# Patient Record
Sex: FEMALE | URBAN - NONMETROPOLITAN AREA
[De-identification: names, ages, dates, MRNs, and addresses within clinical notes are randomized per-mention and may not be internally consistent; named-entity substitution may affect disease eponyms.]

---

## 2018-03-27 ENCOUNTER — OFFICE VISIT (OUTPATIENT)
Dept: MEDICAL GROUP | Facility: PHYSICIAN GROUP | Age: 48
End: 2018-03-27
Payer: COMMERCIAL

## 2018-03-27 VITALS
SYSTOLIC BLOOD PRESSURE: 138 MMHG | DIASTOLIC BLOOD PRESSURE: 84 MMHG | TEMPERATURE: 99.2 F | WEIGHT: 213 LBS | HEIGHT: 65 IN | BODY MASS INDEX: 35.49 KG/M2 | HEART RATE: 72 BPM | RESPIRATION RATE: 16 BRPM | OXYGEN SATURATION: 98 %

## 2018-03-27 DIAGNOSIS — E66.9 OBESITY (BMI 35.0-39.9 WITHOUT COMORBIDITY): ICD-10-CM

## 2018-03-27 DIAGNOSIS — R53.83 FATIGUE, UNSPECIFIED TYPE: ICD-10-CM

## 2018-03-27 DIAGNOSIS — Z72.0 TOBACCO ABUSE: ICD-10-CM

## 2018-03-27 DIAGNOSIS — N95.1 POST MENOPAUSAL SYNDROME: ICD-10-CM

## 2018-03-27 DIAGNOSIS — Z13.6 SCREENING FOR CARDIOVASCULAR CONDITION: ICD-10-CM

## 2018-03-27 PROCEDURE — 99204 OFFICE O/P NEW MOD 45 MIN: CPT | Performed by: NURSE PRACTITIONER

## 2018-03-27 RX ORDER — VENLAFAXINE HYDROCHLORIDE 37.5 MG/1
37.5 CAPSULE, EXTENDED RELEASE ORAL DAILY
Qty: 30 CAP | Refills: 3 | Status: SHIPPED | OUTPATIENT
Start: 2018-03-27 | End: 2023-10-24

## 2018-03-27 ASSESSMENT — PATIENT HEALTH QUESTIONNAIRE - PHQ9: CLINICAL INTERPRETATION OF PHQ2 SCORE: 0

## 2018-03-27 NOTE — ASSESSMENT & PLAN NOTE
Patient reports she has gained 50 lbs in the last 2 years since moving to [place].  She reports that her eating habits have not changed.  When she has tried to diet, she cannot lose weight.  She reports her job is very physical, and does not participate in routine exercise.  She is wondering if her thyroid is not working correctly.  Her mother has hypothyroidism.  Patient has not been diagnosed with thyroid problems in the past. Patient reports fatigue, dry skin, brain fog. She denies constipation or cold intolerance. She admits to snoring and waking several times at night. We did discuss sleep study to look for sleep apnea, the patient will like to wait.  She will consider sleep study referral and medical weight management referral if lab work results are normal. Will get a TSH and free T4.

## 2018-03-27 NOTE — ASSESSMENT & PLAN NOTE
Patient reports she smokes about a pack a day for the last 40 years.  She reports she would like to quit and has tried nicotine gum.  She is planning on trying the nicotine patch next.  She is hesitant to use oral medication because she reports she had suicidal thoughts as a teenager.  She denies suicidal thoughts since then.  Also discussed 1-800-QUIT-NOW as a resource.

## 2018-03-27 NOTE — PROGRESS NOTES
CC: To establish care, unexpected weight gain, and postmenopausal syndrome    HISTORY OF THE PRESENT ILLNESS: Patient is a 47 y.o. female. This pleasant patient is here today to establish care and for postmenopausal symptoms, and unexpected weight gain.  Patient moved to [facility] about 2 years ago, and has not had a primary care provider in 4 years.  She does not take any routine medications and denies any chronic health problems.     Health Maintenance: Patient had hysterectomy 15 years ago for endometriosis.  Pap smears are not necessary.      Post menopausal syndrome  Patient reports an increase in irritability in the last couple years. She also reports constantly feeling hot. She reports she tried over-the-counter product for menopausal symptoms, but this caused severe diarrhea and she discontinued it. She does report that it helped her mood.  She is requesting hormone replacement.  She has not taken prescribed HRT in the past, though she did have total abdominal hysterectomy oopherectomy 15 years ago.  She has a history of breast cancer in her family and we discussed risks of HRT.  Non-HRT options reviewed.  Patient would like to trial low-dose venlafaxine.    Obesity (BMI 35.0-39.9 without comorbidity) (HCC)  Patient reports she has gained 50 lbs in the last 2 years since moving to [place].  She reports that her eating habits have not changed.  When she has tried to diet, she cannot lose weight.  She reports her job is very physical, and does not participate in routine exercise.  She is wondering if her thyroid is not working correctly.  Her mother has hypothyroidism.  Patient has not been diagnosed with thyroid problems in the past. Patient reports fatigue, dry skin, brain fog. She denies constipation or cold intolerance. She admits to snoring and waking several times at night. We did discuss sleep study to look for sleep apnea, the patient will like to wait.  She will consider sleep study referral and  medical weight management referral if lab work results are normal. Will get a TSH and free T4.    Tobacco abuse  Patient reports she smokes about a pack a day for the last 40 years.  She reports she would like to quit and has tried nicotine gum.  She is planning on trying the nicotine patch next.  She is hesitant to use oral medication because she reports she had suicidal thoughts as a teenager.  She denies suicidal thoughts since then.  Also discussed 1-800-QUIT-NOW as a resource.      Allergies: Codeine    Current Outpatient Prescriptions Ordered in [facility]   Medication Sig Dispense Refill   • venlafaxine XR (EFFEXOR XR) 37.5 MG CAPSULE SR 24 HR Take 1 Cap by mouth every day. 30 Cap 3     No current Epic-ordered facility-administered medications on file.        No past medical history on file.    Past Surgical History:   Procedure Laterality Date   • ACL RECONSTRUCTION Left 2007   • LAPAROSCOPY  2006    lysis of adhesions   • HYSTERECTOMY, TOTAL ABDOMINAL  2002    ovaries taken also   • TUBAL LIGATION  1997   • PRIMARY C SECTION  1987       Social History   Substance Use Topics   • Smoking status: Current Every Day Smoker     Packs/day: 1.00     Years: 40.00     Types: Cigarettes   • Smokeless tobacco: Never Used   • Alcohol use Yes      Comment: occ       Family History   Problem Relation Age of Onset   • Hypertension Mother    • Thyroid Mother    • Alcohol abuse Father    • Lung Disease Father      COPD   • Breast Cancer Sister    • No Known Problems Brother    • Diabetes Maternal Grandmother    • Diabetes Paternal Grandmother    • Breast Cancer Paternal Grandmother    • Breast Cancer Paternal Aunt        ROS:     - Constitutional: Negative for fever, chills.     - HEENT: Negative for headaches, vision changes, hearing changes, ear pain, rhinorrhea, sinus congestion, and sore throat.      - Respiratory: Negative for cough. Positive mild dyspnea with exertion.      - Cardiovascular: Negative for chest pain and  "bilateral lower extremity edema.     - Gastrointestinal: Negative for heartburn, nausea, vomiting, abdominal pain,  melena.     - Genitourinary: Negative for dysuria, polyuria.    - Musculoskeletal: Negative for myalgias.     - Skin: Negative for rash.     - Neurological: Negative for dizziness, tingling, tremors.     - Psychiatric/Behavioral: Negative for depression, suicidal/homicidal ideation.            Exam: Blood pressure 138/84, pulse 72, temperature 37.3 °C (99.2 °F), resp. rate 16, height 1.638 m (5' 4.5\"), weight 96.6 kg (213 lb), SpO2 98 %. Body mass index is 36 kg/m².    General: Alert, pleasant, obese habitus, well developed female in NAD  HEENT: Normocephalic. Eyes conjunctiva clear lids without ptosis, pupils equal and reactive to light, ears normal shape and contour, canals are clear bilaterally, tympanic membranes are pearly gray with good light reflex,  oropharynx is without erythema, edema or exudates.   Neck: Supple without bruit. Thyroid is not enlarged.  Pulmonary: Clear to ausculation.  Normal effort. No rales, ronchi, or wheezing.  Cardiovascular: Normal rate and rhythm without murmur. Carotid and radial pulses are intact and equal bilaterally.  No lower extremity edema.  Abdomen: Soft, nontender, nondistended. Normal bowel sounds. Liver and spleen are not palpable  Neurologic: Grossly nonfocal  Lymph: No cervical or supraclavicular lymph nodes are palpable  Skin: Warm and dry.  No obvious lesions.  Musculoskeletal: Normal gait.   Psych: Normal mood and affect. Alert and oriented. Judgment and insight is normal.    Please note that this dictation was created using voice recognition software. I have made every reasonable attempt to correct obvious errors, but I expect that there are errors of grammar and possibly content that I did not discover before finalizing the note.      Assessment/Plan  1. Obesity (BMI 35.0-39.9 without comorbidity)  Will notify patient of results on My Chart.  Will " consider referral to Sleep Medicine and Medical Weight Management.  - TSH; Future  - FREE THYROXINE; Future    2. Fatigue, unspecified type  Will notify patient of results.  - VITAMIN D,25 HYDROXY; Future    3. Screening for cardiovascular condition  Will notify patient of results.  - LIPID PROFILE; Future  - COMP METABOLIC PANEL; Future  - ESTIMATED GFR; Future    4. Post menopausal syndrome  We reviewed that medication takes 4 to 6 weeks to reach full effect, and that side effects of headache and nausea are temporary lasting about 2 weeks.  Patient will follow-up in clinic in 4 to 6 weeks.  - venlafaxine XR (EFFEXOR XR) 37.5 MG CAPSULE SR 24 HR; Take 1 Cap by mouth every day.  Dispense: 30 Cap; Refill: 3    5. Tobacco abuse  Patient will continue to work on smoking cessation.    Patient will return to clinic in 4 to 6 weeks for follow-up on venlafaxine for post menopausal symptoms and to review labs.

## 2018-04-09 ENCOUNTER — HOSPITAL ENCOUNTER (OUTPATIENT)
Dept: LAB | Facility: MEDICAL CENTER | Age: 48
End: 2018-04-09
Attending: NURSE PRACTITIONER
Payer: COMMERCIAL

## 2018-04-09 DIAGNOSIS — E66.9 OBESITY (BMI 35.0-39.9 WITHOUT COMORBIDITY): ICD-10-CM

## 2018-04-09 DIAGNOSIS — R53.83 FATIGUE, UNSPECIFIED TYPE: ICD-10-CM

## 2018-04-09 DIAGNOSIS — Z13.6 SCREENING FOR CARDIOVASCULAR CONDITION: ICD-10-CM

## 2018-04-09 LAB
25(OH)D3 SERPL-MCNC: 25 NG/ML (ref 30–100)
ALBUMIN SERPL BCP-MCNC: 4.2 G/DL (ref 3.2–4.9)
ALBUMIN/GLOB SERPL: 1.4 G/DL
ALP SERPL-CCNC: 29 U/L (ref 30–99)
ALT SERPL-CCNC: 18 U/L (ref 2–50)
ANION GAP SERPL CALC-SCNC: 7 MMOL/L (ref 0–11.9)
AST SERPL-CCNC: 20 U/L (ref 12–45)
BILIRUB SERPL-MCNC: 0.4 MG/DL (ref 0.1–1.5)
BUN SERPL-MCNC: 16 MG/DL (ref 8–22)
CALCIUM SERPL-MCNC: 10 MG/DL (ref 8.5–10.5)
CHLORIDE SERPL-SCNC: 105 MMOL/L (ref 96–112)
CHOLEST SERPL-MCNC: 161 MG/DL (ref 100–199)
CO2 SERPL-SCNC: 27 MMOL/L (ref 20–33)
CREAT SERPL-MCNC: 0.64 MG/DL (ref 0.5–1.4)
GLOBULIN SER CALC-MCNC: 3.1 G/DL (ref 1.9–3.5)
GLUCOSE SERPL-MCNC: 89 MG/DL (ref 65–99)
HDLC SERPL-MCNC: 39 MG/DL
LDLC SERPL CALC-MCNC: 106 MG/DL
POTASSIUM SERPL-SCNC: 4.2 MMOL/L (ref 3.6–5.5)
PROT SERPL-MCNC: 7.3 G/DL (ref 6–8.2)
SODIUM SERPL-SCNC: 139 MMOL/L (ref 135–145)
T4 FREE SERPL-MCNC: 0.73 NG/DL (ref 0.53–1.43)
TRIGL SERPL-MCNC: 78 MG/DL (ref 0–149)
TSH SERPL DL<=0.005 MIU/L-ACNC: 2.24 UIU/ML (ref 0.38–5.33)

## 2018-04-09 PROCEDURE — 84439 ASSAY OF FREE THYROXINE: CPT

## 2018-04-09 PROCEDURE — 36415 COLL VENOUS BLD VENIPUNCTURE: CPT

## 2018-04-09 PROCEDURE — 80053 COMPREHEN METABOLIC PANEL: CPT

## 2018-04-09 PROCEDURE — 84443 ASSAY THYROID STIM HORMONE: CPT

## 2018-04-09 PROCEDURE — 82306 VITAMIN D 25 HYDROXY: CPT

## 2018-04-09 PROCEDURE — 80061 LIPID PANEL: CPT

## 2023-10-24 ENCOUNTER — OFFICE VISIT (OUTPATIENT)
Dept: SLEEP MEDICINE | Facility: MEDICAL CENTER | Age: 53
End: 2023-10-24
Attending: INTERNAL MEDICINE
Payer: COMMERCIAL

## 2023-10-24 ENCOUNTER — HOSPITAL ENCOUNTER (OUTPATIENT)
Dept: RADIOLOGY | Facility: MEDICAL CENTER | Age: 53
End: 2023-10-24

## 2023-10-24 VITALS
DIASTOLIC BLOOD PRESSURE: 80 MMHG | WEIGHT: 243 LBS | BODY MASS INDEX: 40.48 KG/M2 | HEIGHT: 65 IN | SYSTOLIC BLOOD PRESSURE: 130 MMHG | OXYGEN SATURATION: 91 % | HEART RATE: 83 BPM

## 2023-10-24 DIAGNOSIS — Z87.891 FORMER SMOKER: ICD-10-CM

## 2023-10-24 DIAGNOSIS — J44.9 OBSTRUCTIVE LUNG DISEASE (GENERALIZED) (HCC): ICD-10-CM

## 2023-10-24 PROCEDURE — 3079F DIAST BP 80-89 MM HG: CPT | Performed by: INTERNAL MEDICINE

## 2023-10-24 PROCEDURE — 99212 OFFICE O/P EST SF 10 MIN: CPT | Performed by: INTERNAL MEDICINE

## 2023-10-24 PROCEDURE — 3075F SYST BP GE 130 - 139MM HG: CPT | Performed by: INTERNAL MEDICINE

## 2023-10-24 PROCEDURE — 99204 OFFICE O/P NEW MOD 45 MIN: CPT | Performed by: INTERNAL MEDICINE

## 2023-10-24 RX ORDER — LOSARTAN POTASSIUM 25 MG/1
TABLET ORAL
COMMUNITY
Start: 2023-10-03 | End: 2023-11-27

## 2023-10-24 RX ORDER — LEVOTHYROXINE SODIUM 0.03 MG/1
25 TABLET ORAL
COMMUNITY
Start: 2023-08-11

## 2023-10-24 ASSESSMENT — ENCOUNTER SYMPTOMS
SPEECH CHANGE: 0
STRIDOR: 0
DOUBLE VISION: 0
FALLS: 0
FEVER: 0
DIAPHORESIS: 0
FOCAL WEAKNESS: 0
EYE REDNESS: 0
CLAUDICATION: 0
SHORTNESS OF BREATH: 0
WEAKNESS: 0
HEARTBURN: 0
EYE DISCHARGE: 0
HEADACHES: 0
NAUSEA: 0
HEMOPTYSIS: 0
DIARRHEA: 0
EYE PAIN: 0
ABDOMINAL PAIN: 0
CHILLS: 0
ORTHOPNEA: 0
SORE THROAT: 0
COUGH: 0
SPUTUM PRODUCTION: 0
TREMORS: 0
DIZZINESS: 0
WEIGHT LOSS: 0
PALPITATIONS: 0
PND: 0
NECK PAIN: 0
WHEEZING: 0
BACK PAIN: 0
VOMITING: 0
PHOTOPHOBIA: 0
CONSTIPATION: 0
BLURRED VISION: 0
MYALGIAS: 0
DEPRESSION: 0
SINUS PAIN: 0

## 2023-10-24 ASSESSMENT — PATIENT HEALTH QUESTIONNAIRE - PHQ9: CLINICAL INTERPRETATION OF PHQ2 SCORE: 0

## 2023-10-24 NOTE — PROGRESS NOTES
Chief Complaint   Patient presents with    New Patient     REF BY DR. BAY FOR CHRONIC COUGH, ASTHMA    Results     CXR 9/11/23       HPI: This patient is a 53 y.o. female presenting for evaluation of chronic cough, presumed asthma.  The patient's past medical history significant for hypertension and hypothyroid.  She is a former tobacco smoker with 45-pack-year history and quit in May 2023.  She has 4 dogs in the house but denies any allergy symptoms to them.  No mold damage in the home.  No wood-burning fires.  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  She believes she developed COVID in January 2020 and was quite ill at that time but then suffered a more mild case in March 2022 after which she seemed to have recurrent episodes of bronchitis 1 of which required admission to the hospital at [facility] in May.  Eosinophils were high normal at that time.  She has been treated on multiple occasions over the past year with bronchodilator therapy, oral and inhaled corticosteroids.  Her most recent ER visit was in September at which point she was given steroids in addition to another inhaler.  She was also experiencing bilateral lower extremity swelling with recurrent steroid burst and on amlodipine.  She had been started on Lasix and potassium for this.  After her most recent ER visit she stopped all medication other than losartan and Synthroid including bronchodilators and inhaled corticosteroid therapies.  She did complete steroid burst and feels her breathing is markedly improved.  She denies shortness of breath on a daily basis outside of her level of fitness.  She cannot identify any particular triggers to her recurrent symptoms and denies gastroesophageal reflux or postnasal drip.  She did not tolerate powdered forms of inhalants with Advair discus or Trelegy and does not desire another inhaler today.  Chest x-ray from 9/11/2023 was clear.  She is  91% at rest on room air today.  No past medical history on file.    Social History     Socioeconomic History    Marital status: Unknown     Spouse name: Not on file    Number of children: Not on file    Years of education: Not on file    Highest education level: Not on file   Occupational History    Not on file   Tobacco Use    Smoking status: Every Day     Current packs/day: 1.00     Average packs/day: 1 pack/day for 40.0 years (40.0 ttl pk-yrs)     Types: Cigarettes    Smokeless tobacco: Never   Substance and Sexual Activity    Alcohol use: Yes     Comment: occ    Drug use: No    Sexual activity: Yes     Partners: Male     Comment:    Other Topics Concern    Not on file   Social History Narrative    Not on file     Social Determinants of Health     Financial Resource Strain: Not on file   Food Insecurity: Not on file   Transportation Needs: Not on file   Physical Activity: Not on file   Stress: Not on file   Social Connections: Not on file   Intimate Partner Violence: Not on file   Housing Stability: Not on file       Family History   Problem Relation Age of Onset    Hypertension Mother     Thyroid Mother     Alcohol abuse Father     Lung Disease Father         COPD    Breast Cancer Sister     No Known Problems Brother     Diabetes Maternal Grandmother     Diabetes Paternal Grandmother     Breast Cancer Paternal Grandmother     Breast Cancer Paternal Aunt        Current Outpatient Medications on File Prior to Visit   Medication Sig Dispense Refill    levothyroxine (SYNTHROID) 25 MCG Tab       losartan (COZAAR) 25 MG Tab       venlafaxine XR (EFFEXOR XR) 37.5 MG CAPSULE SR 24 HR Take 1 Cap by mouth every day. (Patient not taking: Reported on 10/24/2023) 30 Cap 3     No current facility-administered medications on file prior to visit.       Allergies: Codeine    ROS:   Review of Systems   Constitutional:  Negative for chills, diaphoresis, fever, malaise/fatigue and weight loss.   HENT:  Negative for  "congestion, ear discharge, ear pain, hearing loss, nosebleeds, sinus pain, sore throat and tinnitus.    Eyes:  Negative for blurred vision, double vision, photophobia, pain, discharge and redness.   Respiratory:  Negative for cough, hemoptysis, sputum production, shortness of breath, wheezing and stridor.    Cardiovascular:  Negative for chest pain, palpitations, orthopnea, claudication, leg swelling and PND.   Gastrointestinal:  Negative for abdominal pain, constipation, diarrhea, heartburn, nausea and vomiting.   Genitourinary:  Negative for dysuria and urgency.   Musculoskeletal:  Negative for back pain, falls, joint pain, myalgias and neck pain.   Skin:  Negative for itching and rash.   Neurological:  Negative for dizziness, tremors, speech change, focal weakness, weakness and headaches.   Endo/Heme/Allergies:  Negative for environmental allergies.   Psychiatric/Behavioral:  Negative for depression.        /80 (BP Location: Right arm, Patient Position: Sitting, BP Cuff Size: Adult)   Pulse 83   Ht 1.651 m (5' 5\")   Wt 110 kg (243 lb)   SpO2 91%     Physical Exam:  Physical Exam  Constitutional:       General: She is not in acute distress.     Appearance: Normal appearance. She is well-developed. She is obese.   HENT:      Head: Normocephalic and atraumatic.      Right Ear: External ear normal.      Left Ear: External ear normal.      Nose: Nose normal. No congestion.      Mouth/Throat:      Mouth: Mucous membranes are moist.      Pharynx: Oropharynx is clear. No oropharyngeal exudate.   Eyes:      General: No scleral icterus.     Extraocular Movements: Extraocular movements intact.      Conjunctiva/sclera: Conjunctivae normal.      Pupils: Pupils are equal, round, and reactive to light.   Neck:      Vascular: No JVD.      Trachea: No tracheal deviation.   Cardiovascular:      Rate and Rhythm: Normal rate and regular rhythm.      Heart sounds: Normal heart sounds. No murmur heard.     No friction rub. " No gallop.   Pulmonary:      Effort: Pulmonary effort is normal. No accessory muscle usage or respiratory distress.      Breath sounds: Normal breath sounds. No wheezing or rales.   Abdominal:      General: There is no distension.      Palpations: Abdomen is soft.      Tenderness: There is no abdominal tenderness.   Musculoskeletal:         General: No tenderness or deformity. Normal range of motion.      Cervical back: Normal range of motion and neck supple.      Right lower leg: No edema.      Left lower leg: No edema.   Lymphadenopathy:      Cervical: No cervical adenopathy.   Skin:     General: Skin is warm and dry.      Findings: No rash.      Nails: There is no clubbing.   Neurological:      Mental Status: She is alert and oriented to person, place, and time.      Cranial Nerves: No cranial nerve deficit.      Gait: Gait normal.   Psychiatric:         Behavior: Behavior normal.       PFTs as reviewed by me personally: None    Imaging as reviewed by me personally: As per HPI    Assessment:  1. Obstructive lung disease (generalized) ([facility])  PULMONARY FUNCTION TESTS -Test requested: Complete Pulmonary Function Test    Albuterol-Budesonide 90-80 MCG/ACT Aerosol      2. Former smoker            Plan:  This patient is presenting with signs and symptoms of what seems more consistent with asthma-COPD overlap syndrome.  I do think she would benefit from inhaled corticosteroids given recurrent ER visits over the past year but she declined any maintenance inhaler.  She did agree to full pulmonary function testing and a sample of air supra which is inhaled corticosteroid with albuterol, and new formulation that can be used as mainly rescue therapy and is now on powdered inhalants.  Patient is tobacco free.  Tobacco free.  We did not discuss lung cancer screening today but can discuss at follow-up.  Full PFTs ordered.  Encouraged ongoing abstinence.  Return in about 3 months (around 1/24/2024) for PFT any time.

## 2023-11-15 PROBLEM — M23.321 MENISCUS, MEDIAL, POSTERIOR HORN DERANGEMENT, RIGHT: Status: ACTIVE | Noted: 2023-11-15

## 2024-10-10 ENCOUNTER — PATIENT MESSAGE (OUTPATIENT)
Dept: HEALTH INFORMATION MANAGEMENT | Facility: OTHER | Age: 54
End: 2024-10-10

## 2024-11-21 ENCOUNTER — HOSPITAL ENCOUNTER (OUTPATIENT)
Dept: RADIOLOGY | Facility: MEDICAL CENTER | Age: 54
End: 2024-11-21
Attending: STUDENT IN AN ORGANIZED HEALTH CARE EDUCATION/TRAINING PROGRAM
Payer: COMMERCIAL

## 2024-11-21 DIAGNOSIS — R60.0 BILATERAL EDEMA OF LOWER EXTREMITY: ICD-10-CM

## 2024-11-21 DIAGNOSIS — R06.02 SHORTNESS OF BREATH: ICD-10-CM

## 2024-11-21 PROCEDURE — 700117 HCHG RX CONTRAST REV CODE 255

## 2024-11-21 PROCEDURE — 71260 CT THORAX DX C+: CPT

## 2024-11-21 RX ADMIN — IOHEXOL 75 ML: 350 INJECTION, SOLUTION INTRAVENOUS at 08:14

## 2025-02-12 ENCOUNTER — HOSPITAL ENCOUNTER (OUTPATIENT)
Dept: RADIOLOGY | Facility: MEDICAL CENTER | Age: 55
End: 2025-02-12
Attending: STUDENT IN AN ORGANIZED HEALTH CARE EDUCATION/TRAINING PROGRAM
Payer: COMMERCIAL

## 2025-02-12 DIAGNOSIS — R16.0 LIVER MASS: ICD-10-CM

## 2025-02-12 PROCEDURE — 700117 HCHG RX CONTRAST REV CODE 255: Mod: JZ

## 2025-02-12 PROCEDURE — 74183 MRI ABD W/O CNTR FLWD CNTR: CPT

## 2025-02-12 PROCEDURE — A9579 GAD-BASE MR CONTRAST NOS,1ML: HCPCS | Mod: JZ

## 2025-02-12 RX ADMIN — GADOTERIDOL 20 ML: 279.3 INJECTION, SOLUTION INTRAVENOUS at 08:49

## 2025-02-13 ENCOUNTER — OFFICE VISIT (OUTPATIENT)
Dept: SLEEP MEDICINE | Facility: MEDICAL CENTER | Age: 55
End: 2025-02-13
Payer: COMMERCIAL

## 2025-02-13 VITALS
WEIGHT: 237 LBS | BODY MASS INDEX: 39.49 KG/M2 | SYSTOLIC BLOOD PRESSURE: 146 MMHG | OXYGEN SATURATION: 95 % | HEART RATE: 86 BPM | HEIGHT: 65 IN | DIASTOLIC BLOOD PRESSURE: 80 MMHG

## 2025-02-13 DIAGNOSIS — R06.09 DYSPNEA ON EXERTION: ICD-10-CM

## 2025-02-13 DIAGNOSIS — F15.11 METHAMPHETAMINE ABUSE IN REMISSION (HCC): ICD-10-CM

## 2025-02-13 DIAGNOSIS — G47.33 OBSTRUCTIVE SLEEP APNEA: ICD-10-CM

## 2025-02-13 DIAGNOSIS — R91.8 LUNG NODULES: ICD-10-CM

## 2025-02-13 DIAGNOSIS — J44.89 EMPHYSEMA WITH CHRONIC BRONCHITIS (HCC): ICD-10-CM

## 2025-02-13 DIAGNOSIS — F17.211 NICOTINE DEPENDENCE, CIGARETTES, IN REMISSION: ICD-10-CM

## 2025-02-13 PROCEDURE — 94761 N-INVAS EAR/PLS OXIMETRY MLT: CPT

## 2025-02-13 PROCEDURE — 99211 OFF/OP EST MAY X REQ PHY/QHP: CPT

## 2025-02-13 PROCEDURE — 99215 OFFICE O/P EST HI 40 MIN: CPT

## 2025-02-13 RX ORDER — GABAPENTIN 100 MG/1
CAPSULE ORAL
COMMUNITY
Start: 2025-01-29

## 2025-02-13 RX ORDER — GUAIFENESIN 1200 MG/1
1 TABLET, EXTENDED RELEASE ORAL 2 TIMES DAILY PRN
Qty: 28 TABLET | Refills: 6 | Status: SHIPPED | OUTPATIENT
Start: 2025-02-13

## 2025-02-13 RX ORDER — ATORVASTATIN CALCIUM 10 MG/1
TABLET, FILM COATED ORAL
COMMUNITY
Start: 2025-01-31

## 2025-02-13 RX ORDER — TIOTROPIUM BROMIDE AND OLODATEROL 3.124; 2.736 UG/1; UG/1
2 SPRAY, METERED RESPIRATORY (INHALATION) DAILY
Qty: 4 G | Refills: 5 | Status: SHIPPED | OUTPATIENT
Start: 2025-02-13

## 2025-02-13 RX ORDER — LEVALBUTEROL TARTRATE 45 UG/1
2 AEROSOL, METERED ORAL EVERY 4 HOURS PRN
Qty: 15 G | Refills: 11 | Status: SHIPPED | OUTPATIENT
Start: 2025-02-13

## 2025-02-13 RX ORDER — HYDROCHLOROTHIAZIDE 25 MG/1
TABLET ORAL
COMMUNITY
Start: 2025-01-06

## 2025-02-13 ASSESSMENT — ENCOUNTER SYMPTOMS
HEMOPTYSIS: 0
HEARTBURN: 0
STRIDOR: 0
CHILLS: 0
SPUTUM PRODUCTION: 0
DIZZINESS: 0
DEPRESSION: 0
WEIGHT LOSS: 0
WHEEZING: 1
FEVER: 0
PALPITATIONS: 0
SHORTNESS OF BREATH: 1
COUGH: 1

## 2025-02-13 NOTE — ASSESSMENT & PLAN NOTE
Order DANIELLA workup d/t cystic appearance on CT  Evaluate echocardiogram due to meth use history   No oxygen desaturation noted on 6 MWT    Orders:    DANIELLA IGG JENIFER W/RFLX TO DANIELLA IGG IFA; Future    ANGIOTENSIN I CONVERTING ENZYME; Future    ANTI-JO01 ABS    ANTI SCL-70 ABS; Future    ANTI-LA (SS-B) AB (RDL)    ANTI-RO (SS-A) AB (RDL)    CCP    MPO AND PR3 WITH REFLEX TO ANCA; Future    RHEUMATOID ARTHRITIS FACTOR; Future    EC-ECHOCARDIOGRAM COMPLETE W/O CONT; Future

## 2025-02-13 NOTE — ASSESSMENT & PLAN NOTE
35 pack year smoking history, quit in 2024  Uses nicotine lozenges that she feels greatly help her cravings

## 2025-02-13 NOTE — ASSESSMENT & PLAN NOTE
Small benign-appearing right lung nodules measuring 3 mm and 5 mm on 11/21/2024    Repeat CT due in November 2025

## 2025-02-13 NOTE — ASSESSMENT & PLAN NOTE
Order echocardiogram to evaluate for PH and RV function  Used for 13 years, quit in 2005    Orders:    EC-ECHOCARDIOGRAM COMPLETE W/O CONT; Future

## 2025-02-13 NOTE — PROGRESS NOTES
Pulmonary Clinic follow up    Date of Service: 2/13/2025    Reason for follow up:  Follow-Up (Last seen 10/24/23 w/ Dr. Pennington for Obstructive lung disease)    History of Present Illness:     Hanna Melo is a 54 y.o. female being evaluated in clinic today for emphysema and progressive dyspnea. PMH includes tobacco use, obesity, PMS, hypothyroid. Patient reports a mostly dry cough, but has been diagnosed with bronchitis 8 x over the last 2 years.     May of 2023, patient was admitted to the hospital for hypoxia of 85%. Patient was admitted for 4 days. Patient had just gotten back from [place], and she believes the humidity was the trigger at that time. Patient's symptoms worsen when going to areas of higher humidity. Patient has tried Airsupra, Pulmicort, and Albuterol but had reactions to all inhalers and felt that they worsened her breathing.     Pulmonary Hx:   - smoking history of 35 pack years, quit in 2024  - worked with microspheres at [employer], reports she used an N95 but feels she needed more protection. Patient also reports she came home with the product in her ears and all over here clothes and had exposure once the mask was removed.    - patient also used meth for 13 years, she quit in 2005    MMRC Grade:   0- Breathless only during strenuous exercise  1- Short of breath when hurrying or going up a small hill  2- Walks slower than friends due to breathlessness, has to stop at own pace  3- Stops to catch breath on level ground after 100m  4- Breathless with ambulating around house or ADLs     Review of Systems   Constitutional:  Negative for chills, fever and weight loss.   Respiratory:  Positive for cough, shortness of breath and wheezing. Negative for hemoptysis, sputum production and stridor.    Cardiovascular:  Negative for chest pain, palpitations and leg swelling.   Gastrointestinal:  Negative for heartburn.   Neurological:  Negative for dizziness.   Psychiatric/Behavioral:  Negative for  "depression.        Current Outpatient Medications on File Prior to Visit   Medication Sig Dispense Refill    gabapentin (NEURONTIN) 100 MG Cap       hydroCHLOROthiazide 25 MG Tab       atorvastatin (LIPITOR) 10 MG Tab       levothyroxine (SYNTHROID) 25 MCG Tab Take 25 mcg by mouth every morning on an empty stomach.       No current facility-administered medications on file prior to visit.       Social History     Tobacco Use    Smoking status: Former     Current packs/day: 1.00     Average packs/day: 1 pack/day for 35.6 years (35.6 ttl pk-yrs)     Types: Cigarettes     Start date: 7/26/1989    Smokeless tobacco: Never   Substance Use Topics    Alcohol use: Yes     Comment: occ    Drug use: No      Past Medical History:   Diagnosis Date    Shortness of breath     Wheezing        Past Surgical History:   Procedure Laterality Date    RECONSTRUCTION, KNEE, ACL Left 2007    LAPAROSCOPY  2006    lysis of adhesions    HYSTERECTOMY, TOTAL ABDOMINAL  2002    ovaries taken also    TUBAL LIGATION  1997    PRIMARY C SECTION  1987     Codeine    Family History   Problem Relation Age of Onset    Hypertension Mother     Thyroid Mother     Alcohol abuse Father     Lung Disease Father         COPD    Breast Cancer Sister     No Known Problems Brother     Diabetes Maternal Grandmother     Diabetes Paternal Grandmother     Breast Cancer Paternal Grandmother     Breast Cancer Paternal Aunt        Vitals:    02/13/25 1000   Height: 1.651 m (5' 5\")   Weight: 108 kg (237 lb)   Weight % change since last entry.: 0 %   BP: (!) 146/80   Pulse: 86   BMI (Calculated): 39.44       Physical Exam  Constitutional:       General: She is not in acute distress.  HENT:      Mouth/Throat:      Mouth: Mucous membranes are moist.   Eyes:      Conjunctiva/sclera: Conjunctivae normal.   Cardiovascular:      Rate and Rhythm: Normal rate and regular rhythm.   Pulmonary:      Effort: Prolonged expiration present.      Breath sounds: Wheezing present. "   Musculoskeletal:      Right lower leg: No edema.      Left lower leg: No edema.   Skin:     General: Skin is warm and dry.      Capillary Refill: Capillary refill takes less than 2 seconds.   Neurological:      General: No focal deficit present.      Mental Status: She is alert and oriented to person, place, and time.   Psychiatric:         Mood and Affect: Mood normal.       Results:    STOPBANG - Sleep Apnea Screening      Flowsheet Row Most Recent Value   S - Have you been told that you SNORE? Yes   T - Are you often TIRED during the day? Yes   O - Do you know if you stop breathing or has anyone witnessed you stop breathing while you were asleep? (OBSTRUCTION) Yes   P - Do you have high blood PRESSURE or on medication to control high blood pressure? Yes   B - Is your Body Mass Index greater than 35? (BMI) Yes   A - Are you 50 years old or older? (AGE) Yes   N - Are you a male with a NECK circumference greater than 17 inches, or a female with a neck circumference greater than 16 inches? Yes   G - Are you male? (GENDER) No   STOPBANG Total Score 7   JERILYN Risk High Risk           7 (2/13/2025 10:37 AM)     CT chest 11/21/2024:        IMPRESSION:     1. Small benign-appearing right lung nodules measuring 3 mm and 5 mm.  2. Emphysema.  3. Few areas of concern for more cystic vs emphysema changes    Vaccinations:    Covid: refuses  Flu: refuses  Pneumonia: refuses  RSV: refuses  Assessment & Plan  Emphysema with chronic bronchitis (HCC)  Chronic 2/2 smoking history of 35 pack years, quit in 2024  Worsening over the last 2 years. Patient has never had a PFT, will order.     GOLD Stage: will calculate at f/u  MMRC Group: 3  Exacerbations in the last year: 4  Change in dyspnea, sputum, or cough: Denies, unable to clear mucus     Routine Tx: Stiolto  PRN Tx: Xopenex  Encouraged regular exercise as tolerated  Annual covid and influenza vaccinations encouraged  Monitor for hypoxemia of oxygen <88% at home and schedule visit  with any changes    Notify clinic with any early signs of exacerbation including change in sputum production or color and increased dyspnea.     Orders:    CBC WITH DIFFERENTIAL; Future    IMMUNOGLOBULINS A/E/G/M, SERUM    PULMONARY FUNCTION TESTS -Test requested: Complete Pulmonary Function Test; Future    Multiple Oximetry; Future    Tiotropium Bromide-Olodaterol (STIOLTO RESPIMAT) 2.5-2.5 MCG/ACT Aero Soln; Take 2 Puffs by mouth every day.    levalbuterol (XOPENEX HFA) 45 MCG/ACT inhaler; Inhale 2 Puffs every four hours as needed for Shortness of Breath.    Guaifenesin 1200 MG TABLET SR 12 HR; Take 1 Tablet by mouth 2 times a day as needed (cough and congestion).    Obstructive sleep apnea  STOPBANG score of 7  Order split study in lab  Refer to sleep medicine    Orders:    Polysomnography, 4 or More    Referral to Pulmonary and Sleep Medicine    Dyspnea on exertion  Order DANIELLA workup d/t cystic appearance on CT  Evaluate echocardiogram due to meth use history   No oxygen desaturation noted on 6 MWT    Orders:    DANIELLA IGG JENIFER W/RFLX TO DANIELLA IGG IFA; Future    ANGIOTENSIN I CONVERTING ENZYME; Future    ANTI-JO01 ABS    ANTI SCL-70 ABS; Future    ANTI-LA (SS-B) AB (RDL)    ANTI-RO (SS-A) AB (RDL)    CCP    MPO AND PR3 WITH REFLEX TO ANCA; Future    RHEUMATOID ARTHRITIS FACTOR; Future    EC-ECHOCARDIOGRAM COMPLETE W/O CONT; Future    Methamphetamine abuse in remission (HCC)  Order echocardiogram to evaluate for PH and RV function  Used for 13 years, quit in 2005    Orders:    EC-ECHOCARDIOGRAM COMPLETE W/O CONT; Future    Nicotine dependence, cigarettes, in remission  35 pack year smoking history, quit in 2024  Uses nicotine lozenges that she feels greatly help her cravings          Lung nodules  Small benign-appearing right lung nodules measuring 3 mm and 5 mm on 11/21/2024    Repeat CT due in November 2025           Return in about 6 weeks (around 3/27/2025), or if symptoms worsen or fail to improve, for f/u on  labs, PFT, and sleep study .     This note was generated using voice recognition software which has a chance of producing errors of grammar and possibly content.  I have made every reasonable attempt to find and correct any obvious errors, but it should be expected that some may not be found prior to finalization of this note.    Time spent in record review prior to patient arrival, reviewing results, and in face-to-face encounter totaled 45 min, excluding any procedures if performed.    Odin GLEASON  [facility]

## 2025-02-13 NOTE — PROCEDURES
Multi-Ox Readings  Multi Ox #1 Room air   O2 sat % at rest 95   O2 sat % on exertion 92   O2 sat average on exertion     Multi Ox #2     O2 sat % at rest     O2 sat % on exertion     O2 sat average on exertion       Oxygen Use     Oxygen Frequency     Duration of need     Is the patient mobile within the home?     CPAP Use?     BIPAP Use?     Servo Titration

## 2025-02-13 NOTE — ASSESSMENT & PLAN NOTE
Chronic 2/2 smoking history of 35 pack years, quit in 2024  Worsening over the last 2 years. Patient has never had a PFT, will order.     GOLD Stage: will calculate at f/u  MMRC Group: 3  Exacerbations in the last year: 4  Change in dyspnea, sputum, or cough: Denies, unable to clear mucus     Routine Tx: Stiolto  PRN Tx: Xopenex  Encouraged regular exercise as tolerated  Annual covid and influenza vaccinations encouraged  Monitor for hypoxemia of oxygen <88% at home and schedule visit with any changes    Notify clinic with any early signs of exacerbation including change in sputum production or color and increased dyspnea.     Orders:    CBC WITH DIFFERENTIAL; Future    IMMUNOGLOBULINS A/E/G/M, SERUM    PULMONARY FUNCTION TESTS -Test requested: Complete Pulmonary Function Test; Future    Multiple Oximetry; Future    Tiotropium Bromide-Olodaterol (STIOLTO RESPIMAT) 2.5-2.5 MCG/ACT Aero Soln; Take 2 Puffs by mouth every day.    levalbuterol (XOPENEX HFA) 45 MCG/ACT inhaler; Inhale 2 Puffs every four hours as needed for Shortness of Breath.    Guaifenesin 1200 MG TABLET SR 12 HR; Take 1 Tablet by mouth 2 times a day as needed (cough and congestion).

## 2025-02-13 NOTE — ASSESSMENT & PLAN NOTE
STOPBANG score of 7  Order split study in lab  Refer to sleep medicine    Orders:    Polysomnography, 4 or More    Referral to Pulmonary and Sleep Medicine

## 2025-02-14 ENCOUNTER — HOSPITAL ENCOUNTER (OUTPATIENT)
Dept: LAB | Facility: MEDICAL CENTER | Age: 55
End: 2025-02-14
Payer: COMMERCIAL

## 2025-02-14 DIAGNOSIS — J44.89 EMPHYSEMA WITH CHRONIC BRONCHITIS (HCC): ICD-10-CM

## 2025-02-14 DIAGNOSIS — R06.09 DYSPNEA ON EXERTION: ICD-10-CM

## 2025-02-14 LAB
BASOPHILS # BLD AUTO: 1.3 % (ref 0–1.8)
BASOPHILS # BLD: 0.08 K/UL (ref 0–0.12)
EOSINOPHIL # BLD AUTO: 0.31 K/UL (ref 0–0.51)
EOSINOPHIL NFR BLD: 4.9 % (ref 0–6.9)
ERYTHROCYTE [DISTWIDTH] IN BLOOD BY AUTOMATED COUNT: 43 FL (ref 35.9–50)
HCT VFR BLD AUTO: 48.1 % (ref 37–47)
HGB BLD-MCNC: 15.6 G/DL (ref 12–16)
IMM GRANULOCYTES # BLD AUTO: 0.01 K/UL (ref 0–0.11)
IMM GRANULOCYTES NFR BLD AUTO: 0.2 % (ref 0–0.9)
LYMPHOCYTES # BLD AUTO: 2.4 K/UL (ref 1–4.8)
LYMPHOCYTES NFR BLD: 38 % (ref 22–41)
MCH RBC QN AUTO: 29.1 PG (ref 27–33)
MCHC RBC AUTO-ENTMCNC: 32.4 G/DL (ref 32.2–35.5)
MCV RBC AUTO: 89.6 FL (ref 81.4–97.8)
MONOCYTES # BLD AUTO: 0.51 K/UL (ref 0–0.85)
MONOCYTES NFR BLD AUTO: 8.1 % (ref 0–13.4)
NEUTROPHILS # BLD AUTO: 3.01 K/UL (ref 1.82–7.42)
NEUTROPHILS NFR BLD: 47.5 % (ref 44–72)
NRBC # BLD AUTO: 0 K/UL
NRBC BLD-RTO: 0 /100 WBC (ref 0–0.2)
PLATELET # BLD AUTO: 357 K/UL (ref 164–446)
PMV BLD AUTO: 11 FL (ref 9–12.9)
RBC # BLD AUTO: 5.37 M/UL (ref 4.2–5.4)
WBC # BLD AUTO: 6.3 K/UL (ref 4.8–10.8)

## 2025-02-14 PROCEDURE — 86431 RHEUMATOID FACTOR QUANT: CPT

## 2025-02-14 PROCEDURE — 86200 CCP ANTIBODY: CPT

## 2025-02-14 PROCEDURE — 82164 ANGIOTENSIN I ENZYME TEST: CPT

## 2025-02-14 PROCEDURE — 86038 ANTINUCLEAR ANTIBODIES: CPT

## 2025-02-14 PROCEDURE — 36415 COLL VENOUS BLD VENIPUNCTURE: CPT

## 2025-02-14 PROCEDURE — 82785 ASSAY OF IGE: CPT

## 2025-02-14 PROCEDURE — 82784 ASSAY IGA/IGD/IGG/IGM EACH: CPT

## 2025-02-14 PROCEDURE — 85025 COMPLETE CBC W/AUTO DIFF WBC: CPT

## 2025-02-14 PROCEDURE — 86235 NUCLEAR ANTIGEN ANTIBODY: CPT

## 2025-02-14 PROCEDURE — 83516 IMMUNOASSAY NONANTIBODY: CPT

## 2025-02-15 LAB — RHEUMATOID FACT SER IA-ACNC: <10 IU/ML (ref 0–14)

## 2025-02-16 LAB
ACE SERPL-CCNC: 32 U/L (ref 16–85)
ENA JO1 AB TITR SER: 2 AU/ML (ref 0–40)
ENA SCL70 IGG SER QL: 2 AU/ML (ref 0–40)
ENA SS-A 60KD AB SER-ACNC: 1 AU/ML (ref 0–40)
ENA SS-A IGG SER QL: 2 AU/ML (ref 0–40)
ENA SS-B IGG SER IA-ACNC: 1 AU/ML (ref 0–40)
IGA SERPL-MCNC: 285 MG/DL (ref 68–408)
IGG SERPL-MCNC: 981 MG/DL (ref 768–1632)
IGM SERPL-MCNC: 58 MG/DL (ref 35–263)

## 2025-02-17 LAB
CCP IGA+IGG SERPL IA-ACNC: 3 UNITS (ref 0–19)
IGE SERPL-ACNC: 139 KU/L
MYELOPEROXIDASE AB SER-ACNC: 0 AU/ML (ref 0–19)
NUCLEAR IGG SER QL IA: NORMAL
PROTEINASE3 AB SER-ACNC: 0 AU/ML (ref 0–19)

## 2025-02-20 ENCOUNTER — PATIENT MESSAGE (OUTPATIENT)
Dept: SLEEP MEDICINE | Facility: MEDICAL CENTER | Age: 55
End: 2025-02-20
Payer: COMMERCIAL

## 2025-02-20 NOTE — PATIENT COMMUNICATION
Called and spoke w pt, per pt went to go  both prescriptions ordered by Odin. K, pt states (STIOLTO RESPIMAT) 2.5-2.5 MCG/ACT Aero Soln was covered by her insurance, but the levalbuterol 45 MCG/ACT inhaler was not, ( there is a previous encounter regarding this matter 02/14/25 ) pt states she just ended up paying out of pocket for it since she has being feeling like she can't breath. Explained to pt we were unable to start a PA for it, and that we were expecting a form, from her insurance, pt states that her insurance said WE have all the information and necessary forms to complete the request, per pt she does not want to call her insurance to figure it out, she wants US to figure it out for her.

## 2025-02-21 ENCOUNTER — TELEPHONE (OUTPATIENT)
Dept: SLEEP MEDICINE | Facility: MEDICAL CENTER | Age: 55
End: 2025-02-21

## 2025-02-21 NOTE — TELEPHONE ENCOUNTER
DOCUMENTATION OF PAR STATUS:    1. Name of Medication & Dose: Levalbuterol Tartrate 45 mcg/act     2. Name of Prescription Coverage Company & phone #: [facility] [employer]    3. Date Prior Auth Submitted: 02/21/25.    4. What information was given to obtain insurance decision? Per Clifford, there is an existing case within the [facility] that has the same patient, prescriber, and drug. This case must be finalized before proceeding with similar requests.  Called and spoke with pt's Insurance plan. They informed me they do have a case open but unable to open it because it is in the pharmacies que but the representative told me she will message the pharmacist to see if they are able to move the rx up.     5. Prior Auth Status? Pending    6. Patient Notified: yes

## 2025-03-18 ENCOUNTER — PATIENT MESSAGE (OUTPATIENT)
Dept: SLEEP MEDICINE | Facility: MEDICAL CENTER | Age: 55
End: 2025-03-18
Payer: COMMERCIAL

## 2025-03-20 ENCOUNTER — APPOINTMENT (OUTPATIENT)
Dept: SLEEP MEDICINE | Facility: MEDICAL CENTER | Age: 55
End: 2025-03-20
Payer: COMMERCIAL

## 2025-03-20 DIAGNOSIS — G47.33 OBSTRUCTIVE SLEEP APNEA: ICD-10-CM

## 2025-03-20 PROCEDURE — 95811 POLYSOM 6/>YRS CPAP 4/> PARM: CPT | Performed by: STUDENT IN AN ORGANIZED HEALTH CARE EDUCATION/TRAINING PROGRAM

## 2025-03-21 ENCOUNTER — RESULTS FOLLOW-UP (OUTPATIENT)
Dept: SLEEP MEDICINE | Facility: MEDICAL CENTER | Age: 55
End: 2025-03-21

## 2025-03-21 DIAGNOSIS — G47.39 COMPLEX SLEEP APNEA SYNDROME: ICD-10-CM

## 2025-03-21 NOTE — PROCEDURES
Patient: SIMEON ZAVALA  ID: [id number] Date: 3/20/2025  MONTAGE: Standard  STUDY TYPE: Split Night  RECORDING TECHNIQUE:   After the scalp was prepared, gold plated electrodes were applied to the scalp according to the International 10-20 System. EEG (electroencephalogram) was continuously monitored from the O1-M2, O2-M1, C3-M2, C4-M1, F3-M2, and F4-M1. EOGs (electrooculograms) were monitored by electrodes placed at the left and right outer canthi. Chin EMG (electromyogram) was monitored by electrodes placed on the mentalis and sub-mentalis muscles. Nasal and oral airflow were monitored using a triple port thermocouple as well as oronasal pressure transducer. Respiratory effort was measured by inductive plethysmography technology employing abdominal and thoracic belts. Blood oxygen saturation and pulse were monitored by pulse oximetry. Heart rhythm was monitored by surface electrocardiogram. Leg EMG was studied using surface electrodes placed on left and right anterior tibialis. A microphone was used to monitor tracheal sounds and snoring. Body position was monitored and documented by technician observation.   SCORING CRITERIA:   A modification of the AASM manual for scoring of sleep and associated events was used. Obstructive apneas were scored by cessation of airflow for at least 10 seconds with continuing respiratory effort. Central apneas were scored by cessation of airflow for at least 10 seconds with no respiratory effort. Hypopneas were scored by a 30% or more reduction in airflow for at least 10 seconds accompanied by arterial oxygen desaturation of 3% or an arousal. For CMS (Medicare) patients, per AASM rule 1B, hypopneas are scored by 30% with mild reduction in airflow for at least 10 seconds accompanied by arterial saturation decreased at 4%.    DIAGNOSTIC  Study start time was 10:12:20 PM. Diagnostic recording time was 238 minutes with a total sleep time of 132 minutes resulting in a sleep efficiency of  55.56%%. Sleep latency from the start of the study was 99 minutes and the latency from sleep to REM was 123 minutes. In total,35 arousals were scored for an arousal index of 15.8.  Respiratory:  There were a total of 82 apneas consisting of 75 obstructive apneas, 0 mixed apneas, and 7 central apneas. A total of 133 hypopneas were scored. The apnea index was 37.13 per hour and the hypopnea index was 60.23 per hour resulting in an overall 3% AHI of 97.36. AHI during REM was 114.3 and AHI while supine was 117.99.  Central apneas accounted for 3.3% of total events.  Oximetry:  There was a mean oxygen saturation of 88.0%. The minimum oxygen saturation during NREM sleep was 81.0% and in REM was 65.0%. Time spent during sleep with oxygen saturations <88% was 62.3 minutes.   Cardiac:  The highest heart rate seen while awake was 89 BPM while the highest heart rate during sleep was 86 BPM with an average sleeping heart rate of 73 BPM.  Limb Movements:  There were a total of 22 PLMs during sleep, which resulted in a PLM index of 10.0. There were 10 PLMs associated with arousals which resulted in a PLMS arousal index of 4.5.    TREATMENT:  Treatment recording time was 3h 21.5m (201 minutes) with a total sleep time of 2h 48.0m (168 minutes) resulting in a sleep efficiency of 83.4%. Sleep latency from the start of treatment was 02 minutes and REM latency from sleep onset was 1h 3.5m. The patient had 54 arousals in total for an arousal index of 19.3.  Respiratory:   There were 120 apneas in total consisting of 18 obstructive apneas, 102 central apneas, and 0 mixed apneas for an apnea index of 42.86. The patient had 38 hypopneas in total, which resulted in a hypopnea index of 13.57. The overall 3% AHI was 56.43, with a REM AHI of 37.11, and a supine AHI of 92.90.  Central apneas accounted for 65% of total events.  Oximetry:  The mean SaO2 during treatment was 91.0%. The minimum oxygen saturation in NREM was 85.0 % and in REM was  83.0%. Patient spent 16.3 minutes of TST with SaO2 <88%.  Cardiac:  The highest heart rate during sleep was 83 BPM with an average sleeping heart rate of 72BPM.  Limb Movements:  There were a total of 0 PLMS during titration sleep time that resulted in an index of 0.0. There were 1 PLMS associated with arousals. This resulted in a PLM arousal index of 0.4.  Titration:   CPAP was tried from 6 to 10. BiPAP was tried from 12/8 to 16/12  This was a fully attended sleep study. This test was technically adequate. This patient was titrated on CPAP starting at 6 cm of water pressure. Patient was titrated up to BiPAP 16/12 cm of water pressure.     Impression:  1.  Severe obstructive sleep apnea with an overall AHI 97 events an hour during diagnostic portion  2.  Significant nocturnal hypoxia due to uncontrolled sleep apnea with time at or below 88% saturation of 62 minutes  3.  Met criteria for split-night protocol was placed on CPAP and BiPAP  4.  On PAP therapy elevated central apneas accounting for 65% of total events  5.  Meets criteria for treatment emergent central sleep apnea    Recommendations:  I recommend the patient return for a ASV/BiPAP ST titration due to the severity of treatment emergent central sleep apnea.     In some cases alternative treatment options may be proven effective in resolving sleep apnea. These options include upper airway surgery, the use of a dental orthotic, weight loss, or positional therapy. Clinical correlation is required. In general patients with sleep apnea are advised to avoid alcohol, sedatives and not to operate a motor vehicle while drowsy.  Untreated sleep apnea increases the risk for cardiovascular and neurovascular disease.

## 2025-03-27 ENCOUNTER — NON-PROVIDER VISIT (OUTPATIENT)
Dept: SLEEP MEDICINE | Facility: MEDICAL CENTER | Age: 55
End: 2025-03-27
Payer: COMMERCIAL

## 2025-03-27 ENCOUNTER — OFFICE VISIT (OUTPATIENT)
Dept: SLEEP MEDICINE | Facility: MEDICAL CENTER | Age: 55
End: 2025-03-27
Payer: COMMERCIAL

## 2025-03-27 VITALS
SYSTOLIC BLOOD PRESSURE: 116 MMHG | HEIGHT: 65 IN | HEART RATE: 82 BPM | WEIGHT: 235 LBS | OXYGEN SATURATION: 94 % | BODY MASS INDEX: 39.15 KG/M2 | DIASTOLIC BLOOD PRESSURE: 64 MMHG

## 2025-03-27 VITALS — BODY MASS INDEX: 39.15 KG/M2 | WEIGHT: 235 LBS | HEIGHT: 65 IN

## 2025-03-27 DIAGNOSIS — J44.89 EMPHYSEMA WITH CHRONIC BRONCHITIS (HCC): ICD-10-CM

## 2025-03-27 DIAGNOSIS — J98.4 RESTRICTIVE LUNG DISEASE: ICD-10-CM

## 2025-03-27 DIAGNOSIS — R06.09 DYSPNEA ON EXERTION: ICD-10-CM

## 2025-03-27 PROCEDURE — 94729 DIFFUSING CAPACITY: CPT

## 2025-03-27 PROCEDURE — 94060 EVALUATION OF WHEEZING: CPT | Mod: 26 | Performed by: STUDENT IN AN ORGANIZED HEALTH CARE EDUCATION/TRAINING PROGRAM

## 2025-03-27 PROCEDURE — 94729 DIFFUSING CAPACITY: CPT | Mod: 26 | Performed by: STUDENT IN AN ORGANIZED HEALTH CARE EDUCATION/TRAINING PROGRAM

## 2025-03-27 PROCEDURE — 94060 EVALUATION OF WHEEZING: CPT

## 2025-03-27 PROCEDURE — 99213 OFFICE O/P EST LOW 20 MIN: CPT

## 2025-03-27 PROCEDURE — 94726 PLETHYSMOGRAPHY LUNG VOLUMES: CPT

## 2025-03-27 PROCEDURE — 94726 PLETHYSMOGRAPHY LUNG VOLUMES: CPT | Mod: 26 | Performed by: STUDENT IN AN ORGANIZED HEALTH CARE EDUCATION/TRAINING PROGRAM

## 2025-03-27 ASSESSMENT — ENCOUNTER SYMPTOMS
HEARTBURN: 0
CHILLS: 0
DIZZINESS: 0
FEVER: 0
SHORTNESS OF BREATH: 1
HEMOPTYSIS: 0
DEPRESSION: 0
WHEEZING: 1
PALPITATIONS: 0
WEIGHT LOSS: 0
SPUTUM PRODUCTION: 0
STRIDOR: 0
COUGH: 1

## 2025-03-27 NOTE — PROGRESS NOTES
Pulmonary Clinic follow up    Date of Service: 3/27/2025     Reason for follow up:  Follow-Up (Last seen 02/13/25 w/ Odin PERALTA APRN/Dx: Emphysema with chronic bronchitis, Dyspnea on exertion) and Results (PFT 03/27/25, Labs 02/14/25/Polysomnography 03/20/25)    History of Present Illness:     Hanna Melo is a 54 y.o. female being evaluated in clinic today for emphysema and progressive dyspnea. PMH includes tobacco use, obesity, PMS, hypothyroid. Patient reports a mostly dry cough, but has been diagnosed with bronchitis 8 x over the last 2 years. May of 2023, patient was admitted to the hospital for hypoxia of 85%. Patient was admitted for 4 days. Patient had just gotten back from [place], and she believes the humidity was the trigger at that time. Patient's symptoms worsen when going to areas of higher humidity. Patient has tried Airsupra, Pulmicort, and Albuterol but had reactions to all inhalers and felt that they worsened her breathing.     Today 3/28/2025: Patient presents today and was unable to tolerate any inhaler that was prescribed.  Patient reports they made her feel like she was drowning.  This makes me concerned that this may have an underlying cardiac etiology.  Today patient has her baseline expiratory wheeze and shortness of breath, but oxygen patient is stable.  Patient does endorse to 4 exacerbations over the last 1 year.  She has never had an HRCT, and we did work her up for possible cystic lung disease, which was negative.  Patient has risk factors for hypersensitivity pneumonitis due to microsphere exposure for 4 to 5 years while she was working at [employer], although her previous CT did not suggest any ILD.  Patient's meth use also increases her risk for pulmonary hypertension, which could be potentially the cause of her symptoms as well.  Her PFT showed a restrictive pattern, although CT scan did not suggest ILD, which may indicate also deconditioning cause but unlikely with a  moderate restriction noted on PFT.     Pulmonary Hx:   - smoking history of 35 pack years, quit in 2024  - worked with microspheres at [employer], reports she used an N95 but feels she needed more protection. Patient also reports she came home with the product in her ears and all over here clothes and had exposure once the mask was removed.    - patient also used meth for 13 years, she quit in 2005    MMRC Grade:   0- Breathless only during strenuous exercise  1- Short of breath when hurrying or going up a small hill  2- Walks slower than friends due to breathlessness, has to stop at own pace  3- Stops to catch breath on level ground after 100m  4- Breathless with ambulating around house or ADLs     Review of Systems   Constitutional:  Negative for chills, fever and weight loss.   Respiratory:  Positive for cough, shortness of breath and wheezing. Negative for hemoptysis, sputum production and stridor.    Cardiovascular:  Negative for chest pain, palpitations and leg swelling.   Gastrointestinal:  Negative for heartburn.   Neurological:  Negative for dizziness.   Psychiatric/Behavioral:  Negative for depression.        Current Outpatient Medications on File Prior to Visit   Medication Sig Dispense Refill    gabapentin (NEURONTIN) 100 MG Cap       hydroCHLOROthiazide 25 MG Tab       levothyroxine (SYNTHROID) 25 MCG Tab Take 25 mcg by mouth every morning on an empty stomach.      atorvastatin (LIPITOR) 10 MG Tab       Tiotropium Bromide-Olodaterol (STIOLTO RESPIMAT) 2.5-2.5 MCG/ACT Aero Soln Take 2 Puffs by mouth every day. 4 g 5    levalbuterol (XOPENEX HFA) 45 MCG/ACT inhaler Inhale 2 Puffs every four hours as needed for Shortness of Breath. 15 g 11    Guaifenesin 1200 MG TABLET SR 12 HR Take 1 Tablet by mouth 2 times a day as needed (cough and congestion). 28 Tablet 6     No current facility-administered medications on file prior to visit.       Social History     Tobacco Use    Smoking status: Former      "Current packs/day: 1.00     Average packs/day: 1 pack/day for 35.7 years (35.7 ttl pk-yrs)     Types: Cigarettes     Start date: 7/26/1989    Smokeless tobacco: Never   Vaping Use    Vaping status: Former   Substance Use Topics    Alcohol use: Not Currently     Comment: occ    Drug use: No      Past Medical History:   Diagnosis Date    Shortness of breath     Wheezing        Past Surgical History:   Procedure Laterality Date    RECONSTRUCTION, KNEE, ACL Left 2007    LAPAROSCOPY  2006    lysis of adhesions    HYSTERECTOMY, TOTAL ABDOMINAL  2002    ovaries taken also    TUBAL LIGATION  1997    PRIMARY C SECTION  1987     Codeine    Family History   Problem Relation Age of Onset    Hypertension Mother     Thyroid Mother     Alcohol abuse Father     Lung Disease Father         COPD    Breast Cancer Sister     No Known Problems Brother     Diabetes Maternal Grandmother     Diabetes Paternal Grandmother     Breast Cancer Paternal Grandmother     Breast Cancer Paternal Aunt        Vitals:    03/27/25 1116   Height: 1.638 m (5' 4.5\")   Weight: 107 kg (235 lb)   Weight % change since last entry.: 0 %   BP: 116/64   Pulse: 82   BMI (Calculated): 39.71       Physical Exam  Constitutional:       General: She is not in acute distress.  HENT:      Mouth/Throat:      Mouth: Mucous membranes are moist.   Eyes:      Conjunctiva/sclera: Conjunctivae normal.   Cardiovascular:      Rate and Rhythm: Normal rate and regular rhythm.   Pulmonary:      Effort: Prolonged expiration present.      Breath sounds: Wheezing present.   Musculoskeletal:      Right lower leg: No edema.      Left lower leg: No edema.   Skin:     General: Skin is warm and dry.      Capillary Refill: Capillary refill takes less than 2 seconds.   Neurological:      General: No focal deficit present.      Mental Status: She is alert and oriented to person, place, and time.   Psychiatric:         Mood and Affect: Mood normal.       Results:    CT chest " 11/21/2024:        IMPRESSION:     1. Small benign-appearing right lung nodules measuring 3 mm and 5 mm.  2. Emphysema.  3. Few areas of concern for more cystic vs emphysema changes     Latest Reference Range & Units 02/14/25 06:18   Ace 16 - 85 U/L 32   Ige, Qn <=214 kU/L 139   Immunoglobulin A 68 - 408 mg/dL 285   Immunoglobulin G 768 - 1632 mg/dL 981   Immunoglobulin M 35 - 263 mg/dL 58   Rheumatoid Factor -Neph- 0 - 14 IU/mL <10   Cyclic Citrullinated Peptide Ab, IgG/A 0 - 19 Units 3   Anti-Scl-70 0 - 40 AU/mL 2   Hailey-1 Antibody, IgG 0 - 40 AU/mL 2   Antinuclear Antibody None Detected  None Detected   SSA 52 (R0)(LAEC) Ab, IgG 0 - 40 AU/mL 2   SSA 60 (R0)(ALEC) Ab, IgG 0 - 40 AU/mL 1   Sjogren'S Anti-Ss-B 0 - 40 AU/mL 1   Myeloperoxidase Ab 0 - 19 AU/mL 0   Serine Proteinase 3, IgG 0 - 19 AU/mL 0       Vaccinations:    Covid: refuses  Flu: refuses  Pneumonia: refuses  RSV: refuses  Assessment & Plan  Restrictive lung disease  Patient has risk factors for hypersensitivity pneumonitis versus occupational lung exposure.  Previous CT was found to have benign appearing cysts and emphysema, patient would benefit from HRCT for further evaluation to rule out possible other cystic lung disease or ILD.     Orders:    CT-CHEST, HIGH RESOLUTION LUNG; Future    Dyspnea on exertion  Patient with history of meth use, which increases her risk for pulmonary pretension, but would not explain the expiratory wheeze.  We will order a BNP, echocardiogram is pending.  Patient did respond to steroids in the past, but will not start chronic steroids as we have concerns of possible cardiac disease.    Orders:    proBrain Natriuretic Peptide, NT; Future    HYPERSENSITIVITY PNEUMONITIS ANTIBODIES; Future      Return in about 2 months (around 5/27/2025), or if symptoms worsen or fail to improve, for f/u on HRCT and labs .     This note was generated using voice recognition software which has a chance of producing errors of grammar and possibly  content.  I have made every reasonable attempt to find and correct any obvious errors, but it should be expected that some may not be found prior to finalization of this note.    Time spent in record review prior to patient arrival, reviewing results, and in face-to-face encounter totaled 45 min, excluding any procedures if performed.    Odin GLEASON  [facility]

## 2025-03-27 NOTE — PROCEDURES
Technician: Abimbola Lux RRT, CPFT  Good patient effort & cooperation.  The results of this test meet the ATS/ERS standards for acceptability & reproducibility.  Test was performed on the [employer] Body Plethysmograph-Elite DX system.  Predicted equations for Spirometry are GLI-2012, ITS for lung volumes, and GLI-2017 for DLCO.  The DLCO was uncorrected for Hgb.  A bronchodilator of Ventolin HFA -2puffs via spacer administered.  DLCO performed during dilation period. IVC is less than 90%.    Interpretation:  FVC 1.82 L, Z-score -3.36  FEV1 1.46 L, Z-score -3.11  FEV1/FVC 81%, Z-score 0.14  TLC 5.18 L, Z-score 0.10  RV/TLC 60%, Z-score 4.19  DLCO 14.91 mL/min/mmHg, Z-score -1.98  Flow-volume loop: Consistent with spirometry    Spirometry shows moderate proportional reductions in both FVC and FEV1.  Lung volumes demonstrate moderate air trapping.  Gas exchange is mildly impaired and fully corrects for alveolar volume loss.    Conclusion: Likely pseudo-restriction secondary to air trapping from stated diagnosis of COPD.  Can also consider interstitial lung disease in the differential diagnosis. Correlate clinically and with imaging.    Manuel Hollingsworth MD  Pulmonary and Critical Care Medicine  [facility]

## 2025-03-28 ASSESSMENT — PATIENT HEALTH QUESTIONNAIRE - PHQ9: CLINICAL INTERPRETATION OF PHQ2 SCORE: 0

## 2025-03-29 NOTE — ASSESSMENT & PLAN NOTE
Patient with history of meth use, which increases her risk for pulmonary pretension, but would not explain the expiratory wheeze.  We will order a BNP, echocardiogram is pending.  Patient did respond to steroids in the past, but will not start chronic steroids as we have concerns of possible cardiac disease.    Orders:    proBrain Natriuretic Peptide, NT; Future    HYPERSENSITIVITY PNEUMONITIS ANTIBODIES; Future

## 2025-04-01 DIAGNOSIS — R06.09 DYSPNEA ON EXERTION: ICD-10-CM

## 2025-04-01 RX ORDER — METHYLPREDNISOLONE 4 MG/1
TABLET ORAL
Qty: 21 TABLET | Refills: 0 | Status: SHIPPED | OUTPATIENT
Start: 2025-04-01

## 2025-04-09 ENCOUNTER — HOSPITAL ENCOUNTER (OUTPATIENT)
Dept: RADIOLOGY | Facility: MEDICAL CENTER | Age: 55
End: 2025-04-09
Payer: COMMERCIAL

## 2025-04-09 DIAGNOSIS — J98.4 RESTRICTIVE LUNG DISEASE: ICD-10-CM

## 2025-04-09 PROCEDURE — 71250 CT THORAX DX C-: CPT

## 2025-04-10 ENCOUNTER — DOCUMENTATION (OUTPATIENT)
Dept: SLEEP MEDICINE | Facility: MEDICAL CENTER | Age: 55
End: 2025-04-10
Payer: COMMERCIAL

## 2025-04-10 DIAGNOSIS — D49.1 BRONCHIAL TUMOR: ICD-10-CM

## 2025-04-10 NOTE — PROGRESS NOTES
Called the patient to discuss HRCT findings.  Reviewed case with Dr. Sol and Dr. Pennington.  It does appear there is a left endobronchial lesion that may be obstructing the patient's airway causing her significant shortness of breath.  Patient was a smoker for many years, quit in 2024.  Many risks for lung cancer.  We discussed bronchoscopy, which patient would like to proceed with.  Patient will be scheduled on Monday 4/14/2025 for bronchoscopy to biopsy the endobronchial lesion.    Odin GLEASON  [facility]

## 2025-04-11 ENCOUNTER — APPOINTMENT (OUTPATIENT)
Dept: ADMISSIONS | Facility: MEDICAL CENTER | Age: 55
End: 2025-04-11
Attending: INTERNAL MEDICINE
Payer: COMMERCIAL

## 2025-04-11 NOTE — Clinical Note
Member Name: Hanna Melo   Member Number: [id number], [id number]   Reference Number: [id number]   Approved Services: Outpatient Procedure   Approved Service Dates: 04/10/2025 - 04/10/2026   Requesting Provider: Odin Mcneill   Requested Provider: [facility]     Dear Hanna Melo:     The following medical service(s) requested by Odin Mcneill have been approved:    Procedure Code Procedure Code Name Requested Quantity Approved Quantity Status   25394 (CPT®) OR BRONCHOSCOPY,DIAGNOSTIC W BRUSH 1 1 Authorized   99630 (CPT®) OR BRONCHOSCOPY,DIAGNOSTIC W LAVAGE 1 1 Authorized   30986 (CPT®) OR BRONCHOSCOPY,PLACEMENT FIDUCIAL MARKERS, * 1 1 Authorized   54397 (CPT®) OR BRONCHOSCOPY,TRANSBRONCH BIOPSY 1 1 Authorized   63915 (CPT®) OR BRONCHOSCOPY,TRANSBRON ASPIR BX 1 1 Authorized   29698 (CPT®) OR BRONCHOSCOPY/LUNG BX, ADD'L 1 1 Authorized   38159 (CPT®) OR BRONCHOSCOPY/NEEDLE BX ADD'L 1 1 Authorized   98428 (CPT®) OR BRONCHOSCOPY,RX STENOSIS 1 1 Authorized       Approved Quantity means the number of visits approved for medication treatments and/or medical services.    The services should be provided by [facility] no later than 04/10/2026. Please contact the provider listed below with any questions.     Provider Information:  [facility]  [phone]    Your plan benefit may require a deductible, co-payment or coinsurance for these services. This authorization does not guarantee [facility] will pay the claim for services that you receive. Payment by [facility] for these services is subject to the terms of your Evidence of Coverage or Summary Plan Description, your eligibility at the time of service, and confirmation of benefit coverage.    For any questions or additional information, please contact Customer Service:    [facility]  Customer Service: [phone] or toll free [phone]  [employer] users dial: 446   Call Center  Hours: Mon - Fri 7 AM to 8 PM PST   Office Hours: Mon - Fri 8 AM to 5 PM PST   E-mail: [email or web address]   Website: [email or web address]       This information is available for free in other languages. Please contact Customer Service at the phone number above for more information. [employer] complies with applicable Federal civil rights laws and does not discriminate on the basis of race, color, national origin, age, disability or sex.      Sincerely,     Healthcare Utilization Management Department     Cc: [facility]   Odin Mcneill

## 2025-04-11 NOTE — Clinical Note
REFERRAL APPROVAL NOTICE         Sent on April 11, 2025                   Hanna Sales Lorie  [address] [facility] [place] [address]                   Dear Ms. Melo,    After a careful review of the medical information and benefit coverage, [facility] has processed your referral. See below for additional details.    If applicable, you must be actively enrolled with your insurance for coverage of the authorized service. If you have any questions regarding your coverage, please contact your insurance directly.    REFERRAL INFORMATION   Referral #:  [id number]  Referred-To Department    Referred-By Provider:  Pulmonary and Sleep Medicine    MERY Brennan   Pulmonary/sleep [facility]      [address]  [address]  [place] [address]  [phone] [address], [address]  [place] [id number] [phone]    Referral Start Date:  04/10/2025  Referral End Date:   04/10/2026           SCHEDULING  If you do not already have an appointment, please call [phone] to make an appointment.   MORE INFORMATION  As a reminder, [facility] ownership has changed, meaning this location is now owned and operated by [facility]. As such, we want to clarify that our patients should expect to receive two separate bills for the services received at [facility] - one representing the [facility] facility fees as the owner of the establishment, and the other to represent the physician's services and subsequent fees. You can speak with your insurance carrier for a pricing estimate by calling the customer service number on the back of your card and ask about charges for a hospital outpatient visit.  If you do not already have a [employer] account, sign up at: [email or web address]  You can access your medical information, make appointments, see lab results, billing information, and  more.  If you have questions regarding this referral, please contact  the [facility] department at:             [phone]. Monday - Friday 7:30AM - 5:00PM.      Sincerely,  [facility]

## 2025-04-12 ENCOUNTER — PATIENT MESSAGE (OUTPATIENT)
Dept: SLEEP MEDICINE | Facility: MEDICAL CENTER | Age: 55
End: 2025-04-12
Payer: COMMERCIAL

## 2025-04-16 ENCOUNTER — PRE-ADMISSION TESTING (OUTPATIENT)
Dept: ADMISSIONS | Facility: MEDICAL CENTER | Age: 55
End: 2025-04-16
Attending: INTERNAL MEDICINE
Payer: COMMERCIAL

## 2025-04-16 ENCOUNTER — TELEPHONE (OUTPATIENT)
Dept: HEALTH INFORMATION MANAGEMENT | Facility: OTHER | Age: 55
End: 2025-04-16
Payer: COMMERCIAL

## 2025-04-16 VITALS — HEIGHT: 65 IN | BODY MASS INDEX: 39.11 KG/M2

## 2025-04-16 DIAGNOSIS — Z01.810 PRE-OPERATIVE CARDIOVASCULAR EXAMINATION: ICD-10-CM

## 2025-04-16 DIAGNOSIS — Z01.812 PRE-OPERATIVE LABORATORY EXAMINATION: ICD-10-CM

## 2025-04-16 RX ORDER — LEVOTHYROXINE SODIUM 75 UG/1
75 TABLET ORAL
COMMUNITY
Start: 2025-04-14

## 2025-04-16 NOTE — PREPROCEDURE INSTRUCTIONS
PreAdmit Telephone Appointment: Reviewed the Preparing for your procedure handout with patient. Patient instructed per pharmacy guidelines regarding taking, holding or contacting provider for instructions on regularly prescribed medications before surgery.    Confirmed with patient where to check in morning of surgery. Handouts/Brochure/Video emailed to patient.    Testing appointment 4/17.

## 2025-04-16 NOTE — PREADMIT AVS NOTE
Current Medications   Medication Instructions    levothyroxine (SYNTHROID) 75 MCG Tab Continue taking medication as prescribed, including morning of procedure     methylPREDNISolone (MEDROL DOSEPAK) 4 MG Tablet Therapy Pack Continue taking medication as prescribed, including morning of procedure (to be complete 4/20)    gabapentin (NEURONTIN) 100 MG Cap Continue taking medication as prescribed, including morning of procedure     hydroCHLOROthiazide 25 MG Tab Hold medication day of procedure

## 2025-04-17 ENCOUNTER — PRE-ADMISSION TESTING (OUTPATIENT)
Dept: ADMISSIONS | Facility: MEDICAL CENTER | Age: 55
End: 2025-04-17
Attending: INTERNAL MEDICINE
Payer: COMMERCIAL

## 2025-04-17 DIAGNOSIS — Z01.812 PRE-OPERATIVE LABORATORY EXAMINATION: ICD-10-CM

## 2025-04-17 DIAGNOSIS — Z01.810 PRE-OPERATIVE CARDIOVASCULAR EXAMINATION: ICD-10-CM

## 2025-04-17 LAB
ANION GAP SERPL CALC-SCNC: 13 MMOL/L (ref 7–16)
BUN SERPL-MCNC: 14 MG/DL (ref 8–22)
CALCIUM SERPL-MCNC: 9.6 MG/DL (ref 8.5–10.5)
CHLORIDE SERPL-SCNC: 100 MMOL/L (ref 96–112)
CO2 SERPL-SCNC: 27 MMOL/L (ref 20–33)
CREAT SERPL-MCNC: 0.7 MG/DL (ref 0.5–1.4)
EKG IMPRESSION: NORMAL
ERYTHROCYTE [DISTWIDTH] IN BLOOD BY AUTOMATED COUNT: 45.7 FL (ref 35.9–50)
GFR SERPLBLD CREATININE-BSD FMLA CKD-EPI: 102 ML/MIN/1.73 M 2
GLUCOSE SERPL-MCNC: 92 MG/DL (ref 65–99)
HCT VFR BLD AUTO: 48.4 % (ref 37–47)
HGB BLD-MCNC: 16.1 G/DL (ref 12–16)
MCH RBC QN AUTO: 29.7 PG (ref 27–33)
MCHC RBC AUTO-ENTMCNC: 33.3 G/DL (ref 32.2–35.5)
MCV RBC AUTO: 89.3 FL (ref 81.4–97.8)
PLATELET # BLD AUTO: 335 K/UL (ref 164–446)
PMV BLD AUTO: 10.2 FL (ref 9–12.9)
POTASSIUM SERPL-SCNC: 4.2 MMOL/L (ref 3.6–5.5)
RBC # BLD AUTO: 5.42 M/UL (ref 4.2–5.4)
SODIUM SERPL-SCNC: 140 MMOL/L (ref 135–145)
WBC # BLD AUTO: 10.4 K/UL (ref 4.8–10.8)

## 2025-04-17 PROCEDURE — 85027 COMPLETE CBC AUTOMATED: CPT

## 2025-04-17 PROCEDURE — 93010 ELECTROCARDIOGRAM REPORT: CPT | Performed by: INTERNAL MEDICINE

## 2025-04-17 PROCEDURE — 93005 ELECTROCARDIOGRAM TRACING: CPT | Mod: TC

## 2025-04-17 PROCEDURE — 80048 BASIC METABOLIC PNL TOTAL CA: CPT

## 2025-04-17 PROCEDURE — 36415 COLL VENOUS BLD VENIPUNCTURE: CPT

## 2025-04-21 ENCOUNTER — HOSPITAL ENCOUNTER (OUTPATIENT)
Facility: MEDICAL CENTER | Age: 55
End: 2025-04-21
Attending: INTERNAL MEDICINE | Admitting: INTERNAL MEDICINE
Payer: COMMERCIAL

## 2025-04-21 ENCOUNTER — ANESTHESIA EVENT (OUTPATIENT)
Dept: SURGERY | Facility: MEDICAL CENTER | Age: 55
End: 2025-04-21
Payer: COMMERCIAL

## 2025-04-21 ENCOUNTER — ANESTHESIA (OUTPATIENT)
Dept: SURGERY | Facility: MEDICAL CENTER | Age: 55
End: 2025-04-21
Payer: COMMERCIAL

## 2025-04-21 ENCOUNTER — APPOINTMENT (OUTPATIENT)
Dept: RADIOLOGY | Facility: MEDICAL CENTER | Age: 55
End: 2025-04-21
Attending: INTERNAL MEDICINE
Payer: COMMERCIAL

## 2025-04-21 VITALS
BODY MASS INDEX: 40.79 KG/M2 | WEIGHT: 244.82 LBS | HEART RATE: 80 BPM | RESPIRATION RATE: 18 BRPM | HEIGHT: 65 IN | SYSTOLIC BLOOD PRESSURE: 157 MMHG | DIASTOLIC BLOOD PRESSURE: 90 MMHG | TEMPERATURE: 97.7 F | OXYGEN SATURATION: 91 %

## 2025-04-21 DIAGNOSIS — R09.02 HYPOXEMIA: ICD-10-CM

## 2025-04-21 DIAGNOSIS — J44.89 EMPHYSEMA WITH CHRONIC BRONCHITIS (HCC): ICD-10-CM

## 2025-04-21 LAB
APPEARANCE FLD: NORMAL
BODY FLD TYPE: NORMAL
CELLS FLD: 71
COLOR FLD: NORMAL
EOSINOPHIL NFR FLD: 2 %
FUNGUS SPEC FUNGUS STN: NORMAL
GRAM STN SPEC: NORMAL
LYMPHOCYTES NFR FLD: 2 %
MONOS+MACROS NFR FLD MANUAL: 25 %
NEUTROPHILS NFR FLD: 1 %
PATHOLOGY CONSULT NOTE: NORMAL
SIGNIFICANT IND 70042: NORMAL
SIGNIFICANT IND 70042: NORMAL
SITE SITE: NORMAL
SITE SITE: NORMAL
SOURCE SOURCE: NORMAL
SOURCE SOURCE: NORMAL

## 2025-04-21 PROCEDURE — 160015 HCHG STAT PREOP MINUTES: Performed by: INTERNAL MEDICINE

## 2025-04-21 PROCEDURE — 87205 SMEAR GRAM STAIN: CPT | Mod: 91

## 2025-04-21 PROCEDURE — 160035 HCHG PACU - 1ST 60 MINS PHASE I: Performed by: INTERNAL MEDICINE

## 2025-04-21 PROCEDURE — 87070 CULTURE OTHR SPECIMN AEROBIC: CPT | Mod: 91

## 2025-04-21 PROCEDURE — 700111 HCHG RX REV CODE 636 W/ 250 OVERRIDE (IP): Performed by: ANESTHESIOLOGY

## 2025-04-21 PROCEDURE — 88305 TISSUE EXAM BY PATHOLOGIST: CPT | Mod: 26 | Performed by: PATHOLOGY

## 2025-04-21 PROCEDURE — 31624 DX BRONCHOSCOPE/LAVAGE: CPT | Performed by: INTERNAL MEDICINE

## 2025-04-21 PROCEDURE — 87015 SPECIMEN INFECT AGNT CONCNTJ: CPT | Mod: 91

## 2025-04-21 PROCEDURE — 700101 HCHG RX REV CODE 250: Performed by: ANESTHESIOLOGY

## 2025-04-21 PROCEDURE — 160048 HCHG OR STATISTICAL LEVEL 1-5: Performed by: INTERNAL MEDICINE

## 2025-04-21 PROCEDURE — 89240 UNLISTED MISC PATH TEST: CPT

## 2025-04-21 PROCEDURE — A9270 NON-COVERED ITEM OR SERVICE: HCPCS | Performed by: ANESTHESIOLOGY

## 2025-04-21 PROCEDURE — 87077 CULTURE AEROBIC IDENTIFY: CPT

## 2025-04-21 PROCEDURE — 160047 HCHG PACU  - EA ADDL 30 MINS PHASE II: Performed by: INTERNAL MEDICINE

## 2025-04-21 PROCEDURE — 160036 HCHG PACU - EA ADDL 30 MINS PHASE I: Performed by: INTERNAL MEDICINE

## 2025-04-21 PROCEDURE — 88305 TISSUE EXAM BY PATHOLOGIST: CPT | Performed by: PATHOLOGY

## 2025-04-21 PROCEDURE — 160002 HCHG RECOVERY MINUTES (STAT): Performed by: INTERNAL MEDICINE

## 2025-04-21 PROCEDURE — 160025 RECOVERY II MINUTES (STATS): Performed by: INTERNAL MEDICINE

## 2025-04-21 PROCEDURE — 160009 HCHG ANES TIME/MIN: Performed by: INTERNAL MEDICINE

## 2025-04-21 PROCEDURE — 700105 HCHG RX REV CODE 258: Performed by: ANESTHESIOLOGY

## 2025-04-21 PROCEDURE — C1889 IMPLANT/INSERT DEVICE, NOC: HCPCS | Performed by: INTERNAL MEDICINE

## 2025-04-21 PROCEDURE — 160041 HCHG SURGERY MINUTES - EA ADDL 1 MIN LEVEL 4: Performed by: INTERNAL MEDICINE

## 2025-04-21 PROCEDURE — 71045 X-RAY EXAM CHEST 1 VIEW: CPT

## 2025-04-21 PROCEDURE — 160046 HCHG PACU - 1ST 60 MINS PHASE II: Performed by: INTERNAL MEDICINE

## 2025-04-21 PROCEDURE — 87116 MYCOBACTERIA CULTURE: CPT

## 2025-04-21 PROCEDURE — 700102 HCHG RX REV CODE 250 W/ 637 OVERRIDE(OP): Performed by: ANESTHESIOLOGY

## 2025-04-21 PROCEDURE — 87102 FUNGUS ISOLATION CULTURE: CPT | Mod: 91

## 2025-04-21 PROCEDURE — 160029 HCHG SURGERY MINUTES - 1ST 30 MINS LEVEL 4: Performed by: INTERNAL MEDICINE

## 2025-04-21 RX ORDER — PHENYLEPHRINE HYDROCHLORIDE 10 MG/ML
INJECTION, SOLUTION INTRAMUSCULAR; INTRAVENOUS; SUBCUTANEOUS PRN
Status: DISCONTINUED | OUTPATIENT
Start: 2025-04-21 | End: 2025-04-21 | Stop reason: SURG

## 2025-04-21 RX ORDER — LIDOCAINE HYDROCHLORIDE 20 MG/ML
INJECTION, SOLUTION EPIDURAL; INFILTRATION; INTRACAUDAL; PERINEURAL PRN
Status: DISCONTINUED | OUTPATIENT
Start: 2025-04-21 | End: 2025-04-21 | Stop reason: SURG

## 2025-04-21 RX ORDER — GLYCOPYRROLATE 0.2 MG/ML
INJECTION INTRAMUSCULAR; INTRAVENOUS PRN
Status: DISCONTINUED | OUTPATIENT
Start: 2025-04-21 | End: 2025-04-21 | Stop reason: SURG

## 2025-04-21 RX ORDER — HALOPERIDOL 5 MG/ML
1 INJECTION INTRAMUSCULAR
Status: DISCONTINUED | OUTPATIENT
Start: 2025-04-21 | End: 2025-04-21 | Stop reason: HOSPADM

## 2025-04-21 RX ORDER — ONDANSETRON 2 MG/ML
INJECTION INTRAMUSCULAR; INTRAVENOUS PRN
Status: DISCONTINUED | OUTPATIENT
Start: 2025-04-21 | End: 2025-04-21 | Stop reason: SURG

## 2025-04-21 RX ORDER — NEOSTIGMINE METHYLSULFATE 1 MG/ML
INJECTION INTRAVENOUS PRN
Status: DISCONTINUED | OUTPATIENT
Start: 2025-04-21 | End: 2025-04-21 | Stop reason: SURG

## 2025-04-21 RX ORDER — ONDANSETRON 2 MG/ML
4 INJECTION INTRAMUSCULAR; INTRAVENOUS
Status: DISCONTINUED | OUTPATIENT
Start: 2025-04-21 | End: 2025-04-21 | Stop reason: HOSPADM

## 2025-04-21 RX ORDER — ACETAMINOPHEN 500 MG
1000 TABLET ORAL ONCE
Status: COMPLETED | OUTPATIENT
Start: 2025-04-21 | End: 2025-04-21

## 2025-04-21 RX ORDER — DIPHENHYDRAMINE HYDROCHLORIDE 50 MG/ML
12.5 INJECTION, SOLUTION INTRAMUSCULAR; INTRAVENOUS
Status: DISCONTINUED | OUTPATIENT
Start: 2025-04-21 | End: 2025-04-21 | Stop reason: HOSPADM

## 2025-04-21 RX ORDER — DEXAMETHASONE SODIUM PHOSPHATE 4 MG/ML
INJECTION, SOLUTION INTRA-ARTICULAR; INTRALESIONAL; INTRAMUSCULAR; INTRAVENOUS; SOFT TISSUE PRN
Status: DISCONTINUED | OUTPATIENT
Start: 2025-04-21 | End: 2025-04-21 | Stop reason: SURG

## 2025-04-21 RX ORDER — HYDROCODONE BITARTRATE AND ACETAMINOPHEN 7.5; 325 MG/15ML; MG/15ML
15 SOLUTION ORAL
Status: DISCONTINUED | OUTPATIENT
Start: 2025-04-21 | End: 2025-04-21 | Stop reason: HOSPADM

## 2025-04-21 RX ORDER — SODIUM CHLORIDE, SODIUM LACTATE, POTASSIUM CHLORIDE, CALCIUM CHLORIDE 600; 310; 30; 20 MG/100ML; MG/100ML; MG/100ML; MG/100ML
INJECTION, SOLUTION INTRAVENOUS
Status: DISCONTINUED | OUTPATIENT
Start: 2025-04-21 | End: 2025-04-21 | Stop reason: SURG

## 2025-04-21 RX ORDER — IPRATROPIUM BROMIDE AND ALBUTEROL SULFATE 2.5; .5 MG/3ML; MG/3ML
3 SOLUTION RESPIRATORY (INHALATION)
Status: DISCONTINUED | OUTPATIENT
Start: 2025-04-21 | End: 2025-04-21 | Stop reason: HOSPADM

## 2025-04-21 RX ORDER — SODIUM CHLORIDE, SODIUM LACTATE, POTASSIUM CHLORIDE, CALCIUM CHLORIDE 600; 310; 30; 20 MG/100ML; MG/100ML; MG/100ML; MG/100ML
INJECTION, SOLUTION INTRAVENOUS CONTINUOUS
Status: ACTIVE | OUTPATIENT
Start: 2025-04-21 | End: 2025-04-21

## 2025-04-21 RX ORDER — HYDROCODONE BITARTRATE AND ACETAMINOPHEN 7.5; 325 MG/15ML; MG/15ML
30 SOLUTION ORAL
Status: DISCONTINUED | OUTPATIENT
Start: 2025-04-21 | End: 2025-04-21 | Stop reason: HOSPADM

## 2025-04-21 RX ORDER — ROCURONIUM BROMIDE 10 MG/ML
INJECTION, SOLUTION INTRAVENOUS PRN
Status: DISCONTINUED | OUTPATIENT
Start: 2025-04-21 | End: 2025-04-21 | Stop reason: SURG

## 2025-04-21 RX ADMIN — DEXAMETHASONE SODIUM PHOSPHATE 4 MG: 4 INJECTION INTRA-ARTICULAR; INTRALESIONAL; INTRAMUSCULAR; INTRAVENOUS; SOFT TISSUE at 11:44

## 2025-04-21 RX ADMIN — ONDANSETRON 4 MG: 2 INJECTION INTRAMUSCULAR; INTRAVENOUS at 11:28

## 2025-04-21 RX ADMIN — PROPOFOL 200 MG: 10 INJECTION, EMULSION INTRAVENOUS at 11:19

## 2025-04-21 RX ADMIN — NEOSTIGMINE METHYLSULFATE 5 MG: 1 INJECTION INTRAVENOUS at 11:46

## 2025-04-21 RX ADMIN — GLYCOPYRROLATE 0.8 MG: 0.2 INJECTION INTRAMUSCULAR; INTRAVENOUS at 11:46

## 2025-04-21 RX ADMIN — IPRATROPIUM BROMIDE AND ALBUTEROL SULFATE 3 ML: .5; 2.5 SOLUTION RESPIRATORY (INHALATION) at 12:25

## 2025-04-21 RX ADMIN — SODIUM CHLORIDE, POTASSIUM CHLORIDE, SODIUM LACTATE AND CALCIUM CHLORIDE: 600; 310; 30; 20 INJECTION, SOLUTION INTRAVENOUS at 11:13

## 2025-04-21 RX ADMIN — PHENYLEPHRINE HYDROCHLORIDE 200 MCG: 10 INJECTION INTRAVENOUS at 11:24

## 2025-04-21 RX ADMIN — ACETAMINOPHEN 1000 MG: 500 TABLET ORAL at 13:10

## 2025-04-21 RX ADMIN — ROCURONIUM BROMIDE 30 MG: 10 INJECTION INTRAVENOUS at 11:19

## 2025-04-21 RX ADMIN — LIDOCAINE HYDROCHLORIDE 100 MG: 20 INJECTION, SOLUTION EPIDURAL; INFILTRATION; INTRACAUDAL; PERINEURAL at 11:19

## 2025-04-21 ASSESSMENT — PAIN DESCRIPTION - PAIN TYPE
TYPE: SURGICAL PAIN

## 2025-04-21 NOTE — DISCHARGE PLANNING
Anticipated Discharge Disposition: Home o2 needs orders and measurements    Action: Needs Home o2. Needs DME Flowsheet completed. Bedside RN aware to advise MD not to do orders prior. ER CM texted MD to redo Face to Face and orders once Flowsheet completed. DME choice form and  orders will done and sent once completed.    Barriers to Discharge: Measurements. Face to face redo Orders Choice and orders placed. Delivery can take 4 plus hrs.    Plan: Will cont to follow

## 2025-04-21 NOTE — ANESTHESIA TIME REPORT
Anesthesia Start and Stop Event Times       Date Time Event    4/21/2025 1100 Ready for Procedure     1113 Anesthesia Start     1158 Anesthesia Stop          Responsible Staff  04/21/25      Name Role Begin End    Carlton Rojas M.D. Anesth 1113 1158          Overtime Reason:  no overtime (within assigned shift)    Comments:

## 2025-04-21 NOTE — ANESTHESIA PREPROCEDURE EVALUATION
Case: [id number] Date/Time: 04/21/25 1145    Procedure: FIBER OPTIC BRONCHOSCOPY WITH  WASH, BRONCHOALVEOLAR LAVAGE, BIOPSY AND FINE NEEDLE ASPIRATION (Chest)    Anesthesia type: General    Pre-op diagnosis: BRONCHIAL TUMOR    Location:  ENDOSCOPIC ULTRASOUND ROOM / SURGERY [facility]    Surgeons: Angelica Stringer D.O.            Relevant Problems   ANESTHESIA   (positive) Obstructive sleep apnea      PULMONARY   (positive) Dyspnea on exertion      CARDIAC   (positive) Dyspnea on exertion      Other   (positive) Emphysema with chronic bronchitis (HCC)   (positive) Lung nodules   (positive) Obesity (BMI 35.0-39.9 without comorbidity) (HCC)       Physical Exam    Airway   Mallampati: III  TM distance: >3 FB  Neck ROM: full       Cardiovascular - normal exam  Rhythm: regular  Rate: normal  (-) murmur     Dental - normal exam  (+) lower dentures, upper dentures           Pulmonary - normal exam  Breath sounds clear to auscultation     Abdominal    Neurological - normal exam                   Anesthesia Plan    ASA 3   ASA physical status 3 criteria: COPD - poorly controlled    Plan - general       Airway plan will be ETT          Induction: intravenous      Pertinent diagnostic labs and testing reviewed    Informed Consent:    Anesthetic plan and risks discussed with patient.

## 2025-04-21 NOTE — OR NURSING
1510 - Report received from RN.   Pt arrived to [address] and ambulated to recliner with assistance. Pt family at bedside. Awaiting delivery of home O2. Pt changed into clothing with assistance.     1736 - ER  calling about O2.    1859 - O2 delivery from [facility] with pt delivering home O2.    1906 - Discharge instructions reviewed with family at bedside, verbalized understanding and all questions answered. Pt escorted to car via wheelchair, accompanied by RN. All personal belongings and discharge instructions with patient.

## 2025-04-21 NOTE — ANESTHESIA PROCEDURE NOTES
Airway    Date/Time: 4/21/2025 11:19 AM    Performed by: Carlton Rojas M.D.  Authorized by: Carlton Rojas M.D.    Location:  OR  Urgency:  Elective  Indications for Airway Management:  Anesthesia      Spontaneous Ventilation: absent    Sedation Level:  Deep  Preoxygenated: Yes    Patient Position:  Sniffing  Final Airway Type:  Endotracheal airway  Final Endotracheal Airway:  ETT  Cuffed: Yes    Technique Used for Successful ETT Placement:  Direct laryngoscopy    Insertion Site:  Oral  Blade Type:  Garay  Laryngoscope Blade/Videolaryngoscope Blade Size:  2  ETT Size (mm):  8.0  Measured from:  Teeth  ETT to Teeth (cm):  22  Placement Verified by: auscultation and capnometry    Cormack-Lehane Classification:  Grade I - full view of glottis  Number of Attempts at Approach:  1

## 2025-04-21 NOTE — DISCHARGE PLANNING
Received Choice Form at: 7584  Agency/Facility Name:  [facility]  Sent Referral per Choice Form at: 0084 manual to follow to [facility]

## 2025-04-21 NOTE — FACE TO FACE
"Face to Face Note  -  Durable Medical Equipment    Angelica Stringer D.O. - NPI: [id number]  I certify that this patient is under my care and that they had a durable medical equipment(DME)face to face encounter by myself that meets the physician DME face-to-face encounter requirements with this patient on:    Date of encounter:   Patient:                    MRN:                       YOB: 2026  Hanna Melo  [id number]  1970     The encounter with the patient was in whole, or in part, for the following medical condition, which is the primary reason for durable medical equipment:  COPD and Other - Endobronchial mass    I certify that, based on my findings, the following durable medical equipment is medically necessary:    Oxygen   HOME O2 Saturation Measurements:(Values must be present for Home Oxygen orders)  Room air sat at rest: 86  Room air sat with amb: 96  With liters of O2: 1, O2 sat at rest with O2: 96  With Liters of O2: 1, O2 sat with amb with O2 : 99  Is the patient mobile?: Yes  If patient feels more short of breath, they can go up to 6 liters per minute and contact healthcare provider.    Supporting Symptoms: The patient requires supplemental oxygen, as the following interventions have been tried with limited or no improvement: \"Bronchodilators and/or steroid inhalers, \"Ambulation with oximetry, and \"Incentive spirometry.    My Clinical findings support the need for the above equipment due to:  Hypoxia    __________  Angelica Stringer, DO   Pulmonary and Critical Care    "

## 2025-04-21 NOTE — ANESTHESIA POSTPROCEDURE EVALUATION
Patient: Hanna Melo    Procedure Summary       Date: 04/21/25 Room / Location:  ENDOSCOPIC ULTRASOUND ROOM / SURGERY [facility]    Anesthesia Start: 1113 Anesthesia Stop: 1158    Procedure: FIBER OPTIC BRONCHOSCOPY WITH  WASH, BRONCHOALVEOLAR LAVAGE, BIOPSY AND FINE NEEDLE ASPIRATION (Chest) Diagnosis:       Bronchial tumor      (BRONCHIAL TUMOR)    Surgeons: Angelica Stringer D.O. Responsible Provider: Carlton Rojas M.D.    Anesthesia Type: general ASA Status: 3            Final Anesthesia Type: general  Last vitals  BP   Blood Pressure: 112/64    Temp   36.1 °C (97 °F)    Pulse   67   Resp   (!) 24    SpO2   91 %      Anesthesia Post Evaluation    Patient location during evaluation: PACU  Patient participation: complete - patient participated  Level of consciousness: awake and alert    Airway patency: patent  Anesthetic complications: no  Cardiovascular status: hemodynamically stable  Respiratory status: acceptable  Hydration status: euvolemic    PONV: none          There were no known notable events for this encounter.     Nurse Pain Score: 0 (NPRS)

## 2025-04-21 NOTE — DISCHARGE INSTRUCTIONS
Bronchoscopy Discharge Instructions  Home Care Instructions    ACTIVITY: Rest and take it easy for the first 24 hours.  A responsible adult is recommended to remain with you during that time.  It is normal to feel sleepy.  We encourage you to not do anything that requires balance, judgment or coordination.    The medicine you had during the bronchoscopy will make you sleepy.    FOR 24 HOURS DO NOT:  Drive, operate machinery or run household appliances.  Drink beer or alcoholic beverages.  Make important decisions or sign legal documents.  Engage in activity that requires sharp judgment and reflexes for 24 hours    SPECIAL INSTRUCTIONS:    Bronchoscopy is a procedure to look inside your windpipe and bronchial tubes.  An anesthetic solution is sprayed in your throat to make it numb.    You may experience a mild sore throat, hoarseness, fever up to 101?F, and /or coughing up small amounts of blood immediately following your bronchoscopy, especially if a biopsy was performed.  The discomfort should subside in 24-48 hours.    Do not smoke for 6-8 hours after the procedure to decrease your risk of coughing and /or bleeding.    Do not drink fluids or eat until your gag reflex returns, for two hours after the bronchoscopy.  Otherwise you will not feel the food or fluid in your throat, and it may go down your windpipe and cause you to choke.    Take ice chips or slowly sip cool fluids to make sure your gag reflex has returned.  Avoid hot fluids from the microwave for several hours.    After 2 hours or when you get home you may take throat lozenges or gargle with salt water if your throat is sore.  Drink liquids to help dryness in your mouth and throat.    Resume your normal activities the following day.    MEDICATIONS: Resume taking daily medication as directed by your doctor.  Other Medications:    A follow-up appointment should be arranged with your doctor in 1 week to get the results of the bronchoscopy and any tests done  during the procedure; call to schedule.     You should CALL YOUR PHYSICIAN if you develop:  Fever greater than 101?F  You cough up more than a teaspoon of blood other than blood-tinged mucus  You have increasing amounts of bleeding from coughing after the bronchoscopy  You are wheezing  You develop any unusual signs or symptoms or have any questions                You should call 911 if you develop problems with breathing or chest pain.    If you are unable to contact your doctor or surgical center, you should go to the nearest emergency room or urgent care center.  Physician's telephone #: [phone]      If any questions arise, call your doctor.  If your doctor is not available, please feel free to call the Surgical Center at [phone].  The Center is open Monday through Friday from 7AM to 7PM.  You can also call the [facility] HOTLINE open 24 hours/day, 7 days/week and speak to a nurse at [phone], or toll free at [phone].    You may receive a survey in the mail within the next two weeks and we ask that you take a few moments to complete the survey and return it to us.  Our goal is to provide you with very good care and we value your comments.

## 2025-04-21 NOTE — DISCHARGE PLANNING
Anticipated Discharge Disposition: Home with o2 once delivered    Action: ER CM spoke with pt via phone due to being in OR area and due to expediting orders. DME choice 1 ) [employer] 2) [facility] 3) Elvis She is in [facility]. Faxed to [facility].    Barriers to Discharge: Approval and delivery process can take a long time. Pt and RN aware    Plan: Will cont to follow closely

## 2025-04-21 NOTE — PROCEDURES
Bronchoscopy Procedure Note      Results/Findings: large left mainstem endobronchial mass    Specimen: Biopsy, bronchial washing    Location: [facility]    Date of Operation: 4/21/2025     Attending Physician Performing Procedure: Angelica Stringer D.O.     Pre-op Diagnosis: left mainstem endobronchial mass    Post-op Diagnosis: same    Anesthesia: General, administered by Dr Rojas    Operation:   Diagnostic flexible fiberoptic bronchoscopy, with bronchoalveolar lavage, endobronchial biopsies    Estimated Blood Loss: <5 cc    Complications: none    Indications and History:    The risks, benefits, complications, treatment options and expected outcomes were discussed with the patient. The possibilities of reaction to medication, pulmonary aspiration, perforation of a viscus, bleeding, failure to diagnose a condition and creating a complication requiring transfusion or operation were discussed with the patient who freely signed the consent.    Description of Procedure:    The patient was seen in the Pre-op area and interval H&P was verified. The patient was taken to the endoscopy suite, identified as Hanna Melo and a Time Out was held and the above information confirmed. After the induction of general anesthesia, the bronchoscope was passed through the ETT. The scope was then passed into the trachea.  Careful inspection of the tracheal lumen was accomplished. The scope was sequentially passed into the left main and then left upper and lower bronchi and segmental bronchi. The scope was then withdrawn and advanced into the right main bronchus and then into the RUL, RML, and RLL bronchi and segmental bronchi.    Findings:    Trachea: normal bronchial mucosa  [place]: normal bronchial mucosa  Right main bronchus: normal bronchial mucosa  Right upper lobe bronchus: normal bronchial mucosa  Right middle lobe bronchus: normal bronchial mucosa  Right lower lobe bronchus: normal bronchial  mucosa  Left main bronchus: large left mass, able to pass with bronchoscope medially and posteriorly, base of mass at carnia of mainstem and KIMI   Left upper lobe bronchus: normal bronchial mucosa  Left lower lobe bronchus: normal bronchial mucosa    Bronchial was was subsequently performed in the left mainstem. Lavage specimen was bloody in gross appearance.    Endobronchial brushings were then performed in the left mainstem mass sent for cytology and micobiology.    __________  This note was generated using voice recognition software which has a chance of producing errors of grammar and content.  I have made every reasonable attempt to find and correct any errors, but it should be expected that some may not be found prior to finalization of this note.    Angelica Stringer, DO   Pulmonary and Critical Care

## 2025-04-21 NOTE — DISCHARGE PLANNING
Anticipated Discharge Disposition: Home with o2    Action: call to [facility] spoke with Sacha. She spoke with [place] office as well. They are processing orders. Radha will hand fax procedure note to Sacha at [facility].    Barriers to Discharge: O2    Plan: Will cont to follow

## 2025-04-21 NOTE — OR NURSING
1155: Patient arrived from [facility] via gurney.    +Stop Bang per protocol for a score of YES.    Sedation/Resp Status: Drowsy. Respirations spontaneous and non-labored.    HR 60s SR; VSS on 6L 02 via mask.     1245: Instructed on IS use, demonstrated good efforts to 1000 for 5 breaths.      1330: Patient cont stable, minimal coughing. Sats drop to low to mid 80s on RA even with use of IS. Updated Dr Stringer - initiated home 02 order process.     1338: Requested Kassy  assist in 02 order.     1430: Cleared for DC after Dr Stringer reviewed post op imaging.     1510: End Stop Bang per protocol - going home on 02. Denies pain. Sats 88-91% on 1L 02 via NC. Denies nausea. States she is ready to discharge. Meets criteria to transfer to Stage II for discharge.

## 2025-04-22 ENCOUNTER — OFFICE VISIT (OUTPATIENT)
Dept: URGENT CARE | Facility: PHYSICIAN GROUP | Age: 55
End: 2025-04-22
Payer: COMMERCIAL

## 2025-04-22 ENCOUNTER — PATIENT MESSAGE (OUTPATIENT)
Dept: SLEEP MEDICINE | Facility: MEDICAL CENTER | Age: 55
End: 2025-04-22
Payer: COMMERCIAL

## 2025-04-22 VITALS
BODY MASS INDEX: 40.98 KG/M2 | HEART RATE: 82 BPM | WEIGHT: 246 LBS | OXYGEN SATURATION: 97 % | HEIGHT: 65 IN | RESPIRATION RATE: 16 BRPM | SYSTOLIC BLOOD PRESSURE: 116 MMHG | DIASTOLIC BLOOD PRESSURE: 78 MMHG | TEMPERATURE: 96.9 F

## 2025-04-22 DIAGNOSIS — I80.8 SEPTIC THROMBOPHLEBITIS OF UPPER EXTREMITY: ICD-10-CM

## 2025-04-22 LAB
FUNGUS SPEC FUNGUS STN: NORMAL
GRAM STN SPEC: NORMAL
SIGNIFICANT IND 70042: NORMAL
SIGNIFICANT IND 70042: NORMAL
SITE SITE: NORMAL
SITE SITE: NORMAL
SOURCE SOURCE: NORMAL
SOURCE SOURCE: NORMAL

## 2025-04-22 PROCEDURE — 3074F SYST BP LT 130 MM HG: CPT | Performed by: FAMILY MEDICINE

## 2025-04-22 PROCEDURE — 99214 OFFICE O/P EST MOD 30 MIN: CPT | Performed by: FAMILY MEDICINE

## 2025-04-22 PROCEDURE — 3078F DIAST BP <80 MM HG: CPT | Performed by: FAMILY MEDICINE

## 2025-04-22 RX ORDER — SULFAMETHOXAZOLE AND TRIMETHOPRIM 800; 160 MG/1; MG/1
1 TABLET ORAL 2 TIMES DAILY
Qty: 14 TABLET | Refills: 0 | Status: SHIPPED | OUTPATIENT
Start: 2025-04-22 | End: 2025-04-29

## 2025-04-22 NOTE — DISCHARGE PLANNING
Anticipated Discharge Disposition: Home with o2     Action: [phone]  Michi en route. Have number to OR PACU. ER PAR has PACU number . Report to [facility]. 6:58 call from . O2 delivering.  He will give her Leon [phone]    Barriers to Discharge: None    Plan: no further er needs

## 2025-04-22 NOTE — DISCHARGE PLANNING
Anticipated Discharge Disposition: Home with o2     Action:  Call to unit. O2 still has not arrived,[id number] Call to [facility] again.  has not shown up yet. Spoke with Jad and kiah. Spoke with Ayaan. Spoke with Sacha she had not heard from Ethel. Orders in at 1:35. Sacha is trying to resolve at 17:57    Barriers to Discharge: None    Plan: Will cont to follow

## 2025-04-23 LAB
PRELIMINARY RPT Q0601: NORMAL
RHODAMINE-AURAMINE STN SPEC: NORMAL

## 2025-04-23 NOTE — PROGRESS NOTES
"Chief Complaint   Patient presents with    Rash     IV placed 4/21 now line going up right arm.            SUBJECTIVE      Chief Complaint   Patient presents with    Rash     IV placed 4/21 now line going up right arm.                Rash  This is a new problem. The problem has been gradually worsening since onset (yesterday) . Pain location:  rt upper ext      Pain is in location where her PIV was        The rash is characterized by redness, swelling and pain. Pt was exposed to nothing. Pertinent negatives include no congestion, cough, fatigue, fever or shortness of breath. Past treatments include nothing.     History   Substance Use Topics    Smoking status: Never Smoker     Smokeless tobacco: No     Alcohol Use: No      Past medical history was unremarkable and not pertinent to current issue      Family History   Problem Relation Age of Onset    Hypertension Mother         Controlled with medication    Thyroid Mother     Alcohol abuse Father     Lung Disease Father             Breast Cancer Sister     No Known Problems Brother     Diabetes Maternal Grandmother             Diabetes Paternal Grandmother     Breast Cancer Paternal Grandmother             Breast Cancer Paternal Aunt          Review of Systems   Constitutional: Negative for fever and fatigue.   HENT: Negative for congestion.    Respiratory: Negative for cough and shortness of breath.    Cardiovascular: Negative for chest pain.   Gastrointestinal: Negative for abdominal pain.   Skin: Positive for rash.   Neurological: Negative for dizziness.   All other systems reviewed and are negative.         Objective:     /78   Pulse 82   Temp 36.1 °C (96.9 °F) (Temporal)   Resp 16   Ht 1.651 m (5' 5\")   Wt 112 kg (246 lb)   SpO2 97%       Physical Exam   Constitutional: pt is oriented to person, place, and time. Pt appears well-developed and well-nourished. No distress.   HENT:   Head: Normocephalic and atraumatic.   Eyes: " Conjunctivae are normal. No scleral icterus.   Cardiovascular: Normal rate and regular rhythm.    Pulmonary/Chest: Effort normal and breath sounds normal. No respiratory distress.        Neurological: pt is alert and oriented to person, place, and time. No cranial nerve deficit.   Skin: Skin is warm. Pt is not diaphoretic.      Area of erythema, warmth, tender to touch over rt forearm.   + firm venous cord noted with erythematous streaking to AC area.       Nursing note and vitals reviewed.              Assessment/Plan:     1. Septic thrombophlebitis of upper extremity   rx motrin 800mg tid prn    - US-EXTREMITY VENOUS UPPER UNILAT RIGHT; Future  - sulfamethoxazole-trimethoprim (BACTRIM DS) 800-160 MG tablet; Take 1 Tablet by mouth 2 times a day for 7 days.  Dispense: 14 Tablet; Refill: 0  Differential diagnosis, natural history, supportive care, and indications for immediate follow-up discussed. All questions answered. Patient agrees with the plan of care.     Follow-up as needed if symptoms worsen or fail to improve to PCP, Urgent care or Emergency Room.     I have personally reviewed prior external notes and test results pertinent to today's visit.  I have independently reviewed and interpreted all diagnostics ordered during this urgent care acute visit.

## 2025-04-24 DIAGNOSIS — R91.8 ENDOBRONCHIAL MASS: ICD-10-CM

## 2025-04-24 LAB
BACTERIA SPEC RESP CULT: NORMAL
BACTERIA TISS AEROBE CULT: ABNORMAL
BACTERIA TISS AEROBE CULT: ABNORMAL
FUNGUS SPEC CULT: NORMAL
FUNGUS SPEC CULT: NORMAL
FUNGUS SPEC FUNGUS STN: NORMAL
FUNGUS SPEC FUNGUS STN: NORMAL
GRAM STN SPEC: ABNORMAL
GRAM STN SPEC: NORMAL
SIGNIFICANT IND 70042: ABNORMAL
SIGNIFICANT IND 70042: NORMAL
SITE SITE: ABNORMAL
SITE SITE: NORMAL
SOURCE SOURCE: ABNORMAL
SOURCE SOURCE: NORMAL

## 2025-04-24 NOTE — Clinical Note
REFERRAL APPROVAL NOTICE         Sent on April 24, 2025                   Hanna Mónica Melo  [address] [place] [address]                   Dear Ms. Melo,    After a careful review of the medical information and benefit coverage, [facility] has processed your referral. See below for additional details.    If applicable, you must be actively enrolled with your insurance for coverage of the authorized service. If you have any questions regarding your coverage, please contact your insurance directly.    REFERRAL INFORMATION   Referral #:  [id number]  Referred-To Provider    Referred-By Provider:  Surgery    Julia K Christopher, D.O. GANSER, JOHN H [address] [place] [address]  [facility] [address]  [phone] [address] [facility] [address]  [place] [id number] [phone]    Referral Start Date:  04/24/2025  Referral End Date:   04/24/2026             SCHEDULING  If you do not already have an appointment, please call [phone] to make an appointment.     MORE INFORMATION  If you do not already have a [employer] account, sign up at: [email or web address]  You can access your medical information, make appointments, see lab results, billing information, and more.  If you have questions regarding this referral, please contact  the [facility] Referrals department at:             [phone]. Monday - Friday 8:00AM - 5:00PM.     Sincerely,    [facility]

## 2025-04-24 NOTE — PROGRESS NOTES
Called and spoke with patient about the results of her biopsy. 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         Mass is obstructing the left mainstem bronchus, will refer to thoracic surgery for consideration of rigid bronchoscopy with resection.     __________  This note was generated using voice recognition software which has a chance of producing errors of grammar and content.  I have made every reasonable attempt to find and correct any errors, but it should be expected that some may not be found prior to finalization of this note.    Angelica Stringer, DO   Pulmonary and Critical Care

## 2025-05-08 NOTE — Clinical Note
Member Name: Hanna Melo   Member Number: [id number]   Reference Number: [id number]   Approved Services: Echos and EKG   Approved Service Dates: 05/08/2025 - 06/01/2028   Requesting Provider: Odin Mcneill   Requested Provider: [facility]     Dear Hanna Melo:     The following medical service(s) requested by Odin Mcneill have been approved:    Procedure Code Procedure Code Name Requested Quantity Approved Quantity Status   85602 (CPT®) ND ECHO HEART XTHORACIC,COMPLETE W DOPPLER 1 1 Authorized       Approved Quantity means the number of visits approved for medication treatments and/or medical services.    The services should be provided by [facility] no later than 06/01/2028. Please contact the provider listed below with any questions.     Provider Information:  [facility]  [phone]    Your plan benefit may require a deductible, co-payment or coinsurance for these services. This authorization does not guarantee [facility] will pay the claim for services that you receive. Payment by [facility] for these services is subject to the terms of your Evidence of Coverage or Summary Plan Description, your eligibility at the time of service, and confirmation of benefit coverage.    For any questions or additional information, please contact Customer Service:    [facility]  Customer Service: [phone] or toll free [phone]  TTY users dial: [phone]   Call Center Hours: Mon - Fri 7 AM to 8 PM PST   Office Hours: Mon - Fri 8 AM to 5 PM [facility]   E-mail: [email or web address]   Website: [email or web address]       This information is available for free in other languages. Please contact Customer Service at the phone number above for more information. [facility] complies with applicable Federal civil rights laws and does not discriminate on the basis of race, color, national origin, age, disability or  sex.      Sincerely,     Healthcare Utilization Management Department     Cc: [facility]   Odin Mcneill

## 2025-05-14 LAB
FUNGUS SPEC CULT: NORMAL
FUNGUS SPEC FUNGUS STN: NORMAL
SIGNIFICANT IND 70042: NORMAL
SITE SITE: NORMAL
SOURCE SOURCE: NORMAL

## 2025-05-19 ENCOUNTER — PATIENT MESSAGE (OUTPATIENT)
Dept: SLEEP MEDICINE | Facility: MEDICAL CENTER | Age: 55
End: 2025-05-19
Payer: COMMERCIAL

## 2025-05-20 ENCOUNTER — APPOINTMENT (OUTPATIENT)
Dept: ADMISSIONS | Facility: MEDICAL CENTER | Age: 55
End: 2025-05-20
Attending: SURGERY
Payer: COMMERCIAL

## 2025-05-22 ENCOUNTER — HOSPITAL ENCOUNTER (OUTPATIENT)
Dept: CARDIOLOGY | Facility: MEDICAL CENTER | Age: 55
End: 2025-05-22
Payer: COMMERCIAL

## 2025-05-22 ENCOUNTER — PRE-ADMISSION TESTING (OUTPATIENT)
Dept: ADMISSIONS | Facility: MEDICAL CENTER | Age: 55
DRG: 164 | End: 2025-05-22
Attending: SURGERY
Payer: COMMERCIAL

## 2025-05-22 DIAGNOSIS — F15.11 METHAMPHETAMINE ABUSE IN REMISSION (HCC): ICD-10-CM

## 2025-05-22 DIAGNOSIS — R06.09 DYSPNEA ON EXERTION: ICD-10-CM

## 2025-05-22 LAB
LV EJECT FRACT  99904: 58
LV EJECT FRACT MOD 2C 99903: 61.29
LV EJECT FRACT MOD 4C 99902: 53.01
LV EJECT FRACT MOD BP 99901: 57.65

## 2025-05-22 PROCEDURE — 93306 TTE W/DOPPLER COMPLETE: CPT

## 2025-05-22 PROCEDURE — 93306 TTE W/DOPPLER COMPLETE: CPT | Mod: 26 | Performed by: INTERNAL MEDICINE

## 2025-05-22 RX ORDER — EZETIMIBE 10 MG/1
10 TABLET ORAL
COMMUNITY

## 2025-05-22 NOTE — PREADMIT AVS NOTE
Current Medications   Medication Instructions    ezetimibe (ZETIA) 10 MG Tab Stop 24 hours before surgery    levothyroxine (SYNTHROID) 75 MCG Tab Continue taking as prescribed.    hydroCHLOROthiazide 25 MG Tab Hold medication day of procedure

## 2025-05-24 ENCOUNTER — APPOINTMENT (OUTPATIENT)
Dept: SLEEP MEDICINE | Facility: MEDICAL CENTER | Age: 55
End: 2025-05-24
Payer: COMMERCIAL

## 2025-06-03 NOTE — Clinical Note
Member Name: Hanna Mleo   Member Number: [id number], [id number]   Reference Number: [id number]   Approved Services: Durable Medical Equipment   Approved Service Dates: 06/03/2025 - 04/20/2026   Requesting Provider: [employer] [facility]   Requested Provider: [facility]     Dear Hanna Melo:     The following medical service(s) requested by [employer] have been approved:    Procedure Code Procedure Code Name Requested Quantity Approved Quantity Status    NE PORTABLE GASEOUS 02 10 10 Authorized       Approved Quantity means the number of visits approved for medication treatments and/or medical services.    The services should be provided by [facility] no later than 04/20/2026. Please contact the provider listed below with any questions.     Provider Information:  [facility]  [phone]    Your plan benefit may require a deductible, co-payment or coinsurance for these services. This authorization does not guarantee [employer] will pay the claim for services that you receive. Payment by [employer] for these services is subject to the terms of your Evidence of Coverage or Summary Plan Description, your eligibility at the time of service, and confirmation of benefit coverage.    For any questions or additional information, please contact Customer Service:    [employer]  Customer Service: [phone] or toll free [phone]  [employer] users dial: 711   Call Center Hours: Mon - Fri 7 AM to 8 PM PST   Office Hours: Mon - Fri 8 AM to 5 PM PST   E-mail: [email or web address]   Website: [email or web address]       This information is available for free in other languages. Please contact Customer Service at the phone number above for more information. [employer] complies with applicable Federal civil rights laws and does not discriminate on the basis of race, color, national origin, age, disability or sex.      Sincerely,     Healthcare Utilization  Management Department     [facility]   [facility]

## 2025-06-06 ENCOUNTER — ANESTHESIA EVENT (OUTPATIENT)
Dept: SURGERY | Facility: MEDICAL CENTER | Age: 55
DRG: 164 | End: 2025-06-06
Payer: COMMERCIAL

## 2025-06-09 ENCOUNTER — ANESTHESIA (OUTPATIENT)
Dept: SURGERY | Facility: MEDICAL CENTER | Age: 55
DRG: 164 | End: 2025-06-09
Payer: COMMERCIAL

## 2025-06-09 ENCOUNTER — APPOINTMENT (OUTPATIENT)
Dept: RADIOLOGY | Facility: MEDICAL CENTER | Age: 55
DRG: 164 | End: 2025-06-09
Attending: PHYSICIAN ASSISTANT
Payer: COMMERCIAL

## 2025-06-09 ENCOUNTER — HOSPITAL ENCOUNTER (INPATIENT)
Facility: MEDICAL CENTER | Age: 55
LOS: 3 days | DRG: 164 | End: 2025-06-12
Attending: SURGERY | Admitting: SURGERY
Payer: COMMERCIAL

## 2025-06-09 DIAGNOSIS — G89.18 POSTOPERATIVE PAIN: Primary | ICD-10-CM

## 2025-06-09 LAB — PATHOLOGY CONSULT NOTE: NORMAL

## 2025-06-09 PROCEDURE — 160048 HCHG OR STATISTICAL LEVEL 1-5: Performed by: SURGERY

## 2025-06-09 PROCEDURE — 0W380ZZ CONTROL BLEEDING IN CHEST WALL, OPEN APPROACH: ICD-10-PCS | Performed by: SURGERY

## 2025-06-09 PROCEDURE — 700101 HCHG RX REV CODE 250: Performed by: ANESTHESIOLOGY

## 2025-06-09 PROCEDURE — 700102 HCHG RX REV CODE 250 W/ 637 OVERRIDE(OP): Performed by: ANESTHESIOLOGY

## 2025-06-09 PROCEDURE — 160015 HCHG STAT PREOP MINUTES: Performed by: SURGERY

## 2025-06-09 PROCEDURE — 770001 HCHG ROOM/CARE - MED/SURG/GYN PRIV*

## 2025-06-09 PROCEDURE — 160041 HCHG SURGERY MINUTES - EA ADDL 1 MIN LEVEL 4: Performed by: SURGERY

## 2025-06-09 PROCEDURE — 700111 HCHG RX REV CODE 636 W/ 250 OVERRIDE (IP): Mod: JZ | Performed by: ANESTHESIOLOGY

## 2025-06-09 PROCEDURE — 700111 HCHG RX REV CODE 636 W/ 250 OVERRIDE (IP): Performed by: ANESTHESIOLOGY

## 2025-06-09 PROCEDURE — 160036 HCHG PACU - EA ADDL 30 MINS PHASE I: Performed by: SURGERY

## 2025-06-09 PROCEDURE — 160002 HCHG RECOVERY MINUTES (STAT): Performed by: SURGERY

## 2025-06-09 PROCEDURE — 700101 HCHG RX REV CODE 250: Performed by: SURGERY

## 2025-06-09 PROCEDURE — 0BB70ZZ EXCISION OF LEFT MAIN BRONCHUS, OPEN APPROACH: ICD-10-PCS | Performed by: SURGERY

## 2025-06-09 PROCEDURE — 88341 IMHCHEM/IMCYTCHM EA ADD ANTB: CPT | Mod: 26,59 | Performed by: PATHOLOGY

## 2025-06-09 PROCEDURE — 88360 TUMOR IMMUNOHISTOCHEM/MANUAL: CPT | Mod: 26 | Performed by: PATHOLOGY

## 2025-06-09 PROCEDURE — 700102 HCHG RX REV CODE 250 W/ 637 OVERRIDE(OP): Performed by: PHYSICIAN ASSISTANT

## 2025-06-09 PROCEDURE — 71045 X-RAY EXAM CHEST 1 VIEW: CPT

## 2025-06-09 PROCEDURE — 0BQ70ZZ REPAIR LEFT MAIN BRONCHUS, OPEN APPROACH: ICD-10-PCS | Performed by: SURGERY

## 2025-06-09 PROCEDURE — 88360 TUMOR IMMUNOHISTOCHEM/MANUAL: CPT | Performed by: PATHOLOGY

## 2025-06-09 PROCEDURE — 88305 TISSUE EXAM BY PATHOLOGIST: CPT | Mod: 26 | Performed by: PATHOLOGY

## 2025-06-09 PROCEDURE — 88305 TISSUE EXAM BY PATHOLOGIST: CPT | Mod: 59 | Performed by: PATHOLOGY

## 2025-06-09 PROCEDURE — A9270 NON-COVERED ITEM OR SERVICE: HCPCS | Performed by: PHYSICIAN ASSISTANT

## 2025-06-09 PROCEDURE — 88342 IMHCHEM/IMCYTCHM 1ST ANTB: CPT | Mod: 26,59 | Performed by: PATHOLOGY

## 2025-06-09 PROCEDURE — A9270 NON-COVERED ITEM OR SERVICE: HCPCS | Performed by: ANESTHESIOLOGY

## 2025-06-09 PROCEDURE — 88307 TISSUE EXAM BY PATHOLOGIST: CPT | Mod: 26 | Performed by: PATHOLOGY

## 2025-06-09 PROCEDURE — 700105 HCHG RX REV CODE 258: Performed by: PHYSICIAN ASSISTANT

## 2025-06-09 PROCEDURE — 700111 HCHG RX REV CODE 636 W/ 250 OVERRIDE (IP): Mod: JZ | Performed by: PHYSICIAN ASSISTANT

## 2025-06-09 PROCEDURE — 160029 HCHG SURGERY MINUTES - 1ST 30 MINS LEVEL 4: Performed by: SURGERY

## 2025-06-09 PROCEDURE — 160035 HCHG PACU - 1ST 60 MINS PHASE I: Performed by: SURGERY

## 2025-06-09 PROCEDURE — 88342 IMHCHEM/IMCYTCHM 1ST ANTB: CPT | Performed by: PATHOLOGY

## 2025-06-09 PROCEDURE — 88341 IMHCHEM/IMCYTCHM EA ADD ANTB: CPT | Performed by: PATHOLOGY

## 2025-06-09 PROCEDURE — 88307 TISSUE EXAM BY PATHOLOGIST: CPT | Performed by: PATHOLOGY

## 2025-06-09 PROCEDURE — 160009 HCHG ANES TIME/MIN: Performed by: SURGERY

## 2025-06-09 RX ORDER — OXYCODONE HCL 5 MG/5 ML
5 SOLUTION, ORAL ORAL
Status: COMPLETED | OUTPATIENT
Start: 2025-06-09 | End: 2025-06-09

## 2025-06-09 RX ORDER — ROCURONIUM BROMIDE 10 MG/ML
INJECTION, SOLUTION INTRAVENOUS PRN
Status: DISCONTINUED | OUTPATIENT
Start: 2025-06-09 | End: 2025-06-09 | Stop reason: SURG

## 2025-06-09 RX ORDER — BUPIVACAINE HYDROCHLORIDE AND EPINEPHRINE 5; 5 MG/ML; UG/ML
INJECTION, SOLUTION EPIDURAL; INTRACAUDAL; PERINEURAL
Status: DISCONTINUED | OUTPATIENT
Start: 2025-06-09 | End: 2025-06-09 | Stop reason: HOSPADM

## 2025-06-09 RX ORDER — HYDROMORPHONE HYDROCHLORIDE 1 MG/ML
0.4 INJECTION, SOLUTION INTRAMUSCULAR; INTRAVENOUS; SUBCUTANEOUS
Status: DISCONTINUED | OUTPATIENT
Start: 2025-06-09 | End: 2025-06-09 | Stop reason: HOSPADM

## 2025-06-09 RX ORDER — OXYCODONE HYDROCHLORIDE 5 MG/1
5 TABLET ORAL EVERY 4 HOURS PRN
Refills: 0 | Status: DISCONTINUED | OUTPATIENT
Start: 2025-06-09 | End: 2025-06-12 | Stop reason: HOSPADM

## 2025-06-09 RX ORDER — FAMOTIDINE 20 MG/1
20 TABLET, FILM COATED ORAL 2 TIMES DAILY
Status: DISCONTINUED | OUTPATIENT
Start: 2025-06-09 | End: 2025-06-12 | Stop reason: HOSPADM

## 2025-06-09 RX ORDER — SODIUM CHLORIDE, SODIUM LACTATE, POTASSIUM CHLORIDE, CALCIUM CHLORIDE 600; 310; 30; 20 MG/100ML; MG/100ML; MG/100ML; MG/100ML
INJECTION, SOLUTION INTRAVENOUS CONTINUOUS
Status: DISCONTINUED | OUTPATIENT
Start: 2025-06-09 | End: 2025-06-12 | Stop reason: HOSPADM

## 2025-06-09 RX ORDER — ONDANSETRON 2 MG/ML
INJECTION INTRAMUSCULAR; INTRAVENOUS PRN
Status: DISCONTINUED | OUTPATIENT
Start: 2025-06-09 | End: 2025-06-09 | Stop reason: SURG

## 2025-06-09 RX ORDER — DIPHENHYDRAMINE HYDROCHLORIDE 50 MG/ML
25 INJECTION, SOLUTION INTRAMUSCULAR; INTRAVENOUS EVERY 6 HOURS PRN
Status: DISCONTINUED | OUTPATIENT
Start: 2025-06-09 | End: 2025-06-12 | Stop reason: HOSPADM

## 2025-06-09 RX ORDER — METHOCARBAMOL 100 MG/ML
1000 INJECTION, SOLUTION INTRAMUSCULAR; INTRAVENOUS EVERY 8 HOURS PRN
Status: DISPENSED | OUTPATIENT
Start: 2025-06-09 | End: 2025-06-12

## 2025-06-09 RX ORDER — DIPHENHYDRAMINE HCL 25 MG
25 TABLET ORAL EVERY 6 HOURS PRN
Status: DISCONTINUED | OUTPATIENT
Start: 2025-06-09 | End: 2025-06-12 | Stop reason: HOSPADM

## 2025-06-09 RX ORDER — ONDANSETRON 2 MG/ML
4 INJECTION INTRAMUSCULAR; INTRAVENOUS
Status: COMPLETED | OUTPATIENT
Start: 2025-06-09 | End: 2025-06-09

## 2025-06-09 RX ORDER — OXYCODONE HCL 5 MG/5 ML
10 SOLUTION, ORAL ORAL
Status: COMPLETED | OUTPATIENT
Start: 2025-06-09 | End: 2025-06-09

## 2025-06-09 RX ORDER — IPRATROPIUM BROMIDE AND ALBUTEROL SULFATE 2.5; .5 MG/3ML; MG/3ML
3 SOLUTION RESPIRATORY (INHALATION)
Status: DISCONTINUED | OUTPATIENT
Start: 2025-06-09 | End: 2025-06-09 | Stop reason: HOSPADM

## 2025-06-09 RX ORDER — SODIUM CHLORIDE 9 MG/ML
INJECTION, SOLUTION INTRAVENOUS CONTINUOUS
Status: DISCONTINUED | OUTPATIENT
Start: 2025-06-09 | End: 2025-06-09 | Stop reason: HOSPADM

## 2025-06-09 RX ORDER — ENOXAPARIN SODIUM 100 MG/ML
40 INJECTION SUBCUTANEOUS DAILY
Status: DISCONTINUED | OUTPATIENT
Start: 2025-06-10 | End: 2025-06-12 | Stop reason: HOSPADM

## 2025-06-09 RX ORDER — ENALAPRILAT 1.25 MG/ML
2.5 INJECTION INTRAVENOUS EVERY 6 HOURS PRN
Status: DISCONTINUED | OUTPATIENT
Start: 2025-06-09 | End: 2025-06-12 | Stop reason: HOSPADM

## 2025-06-09 RX ORDER — ONDANSETRON 4 MG/1
4 TABLET, ORALLY DISINTEGRATING ORAL EVERY 4 HOURS PRN
Status: DISCONTINUED | OUTPATIENT
Start: 2025-06-09 | End: 2025-06-12 | Stop reason: HOSPADM

## 2025-06-09 RX ORDER — HYDROMORPHONE HYDROCHLORIDE 1 MG/ML
0.2 INJECTION, SOLUTION INTRAMUSCULAR; INTRAVENOUS; SUBCUTANEOUS
Status: DISCONTINUED | OUTPATIENT
Start: 2025-06-09 | End: 2025-06-09 | Stop reason: HOSPADM

## 2025-06-09 RX ORDER — KETOROLAC TROMETHAMINE 15 MG/ML
15 INJECTION, SOLUTION INTRAMUSCULAR; INTRAVENOUS EVERY 6 HOURS PRN
Status: DISPENSED | OUTPATIENT
Start: 2025-06-09 | End: 2025-06-12

## 2025-06-09 RX ORDER — HYDRALAZINE HYDROCHLORIDE 20 MG/ML
INJECTION INTRAMUSCULAR; INTRAVENOUS PRN
Status: DISCONTINUED | OUTPATIENT
Start: 2025-06-09 | End: 2025-06-09 | Stop reason: SURG

## 2025-06-09 RX ORDER — SODIUM CHLORIDE, SODIUM LACTATE, POTASSIUM CHLORIDE, CALCIUM CHLORIDE 600; 310; 30; 20 MG/100ML; MG/100ML; MG/100ML; MG/100ML
INJECTION, SOLUTION INTRAVENOUS CONTINUOUS
Status: ACTIVE | OUTPATIENT
Start: 2025-06-09 | End: 2025-06-09

## 2025-06-09 RX ORDER — ONDANSETRON 2 MG/ML
4 INJECTION INTRAMUSCULAR; INTRAVENOUS EVERY 4 HOURS PRN
Status: DISCONTINUED | OUTPATIENT
Start: 2025-06-09 | End: 2025-06-12 | Stop reason: HOSPADM

## 2025-06-09 RX ORDER — ACETAMINOPHEN 500 MG
1000 TABLET ORAL EVERY 6 HOURS PRN
COMMUNITY

## 2025-06-09 RX ORDER — OXYMETAZOLINE HYDROCHLORIDE 0.05 G/100ML
2 SPRAY NASAL 2 TIMES DAILY PRN
COMMUNITY

## 2025-06-09 RX ORDER — DIPHENHYDRAMINE HCL 25 MG
25 TABLET ORAL NIGHTLY PRN
COMMUNITY

## 2025-06-09 RX ORDER — HYDROMORPHONE HYDROCHLORIDE 2 MG/ML
INJECTION, SOLUTION INTRAMUSCULAR; INTRAVENOUS; SUBCUTANEOUS PRN
Status: DISCONTINUED | OUTPATIENT
Start: 2025-06-09 | End: 2025-06-09 | Stop reason: SURG

## 2025-06-09 RX ORDER — OXYCODONE HYDROCHLORIDE 10 MG/1
10 TABLET ORAL EVERY 4 HOURS PRN
Refills: 0 | Status: DISCONTINUED | OUTPATIENT
Start: 2025-06-09 | End: 2025-06-12 | Stop reason: HOSPADM

## 2025-06-09 RX ORDER — HYDRALAZINE HYDROCHLORIDE 20 MG/ML
10 INJECTION INTRAMUSCULAR; INTRAVENOUS EVERY 6 HOURS PRN
Status: DISCONTINUED | OUTPATIENT
Start: 2025-06-09 | End: 2025-06-12 | Stop reason: HOSPADM

## 2025-06-09 RX ORDER — HYDROMORPHONE HYDROCHLORIDE 1 MG/ML
0.1 INJECTION, SOLUTION INTRAMUSCULAR; INTRAVENOUS; SUBCUTANEOUS
Status: DISCONTINUED | OUTPATIENT
Start: 2025-06-09 | End: 2025-06-09 | Stop reason: HOSPADM

## 2025-06-09 RX ORDER — DIPHENHYDRAMINE HYDROCHLORIDE 50 MG/ML
12.5 INJECTION, SOLUTION INTRAMUSCULAR; INTRAVENOUS
Status: DISCONTINUED | OUTPATIENT
Start: 2025-06-09 | End: 2025-06-09 | Stop reason: HOSPADM

## 2025-06-09 RX ORDER — DEXAMETHASONE SODIUM PHOSPHATE 4 MG/ML
INJECTION, SOLUTION INTRA-ARTICULAR; INTRALESIONAL; INTRAMUSCULAR; INTRAVENOUS; SOFT TISSUE PRN
Status: DISCONTINUED | OUTPATIENT
Start: 2025-06-09 | End: 2025-06-09 | Stop reason: SURG

## 2025-06-09 RX ORDER — EZETIMIBE 10 MG/1
10 TABLET ORAL
Status: DISCONTINUED | OUTPATIENT
Start: 2025-06-10 | End: 2025-06-12 | Stop reason: HOSPADM

## 2025-06-09 RX ORDER — HYDROMORPHONE HYDROCHLORIDE 1 MG/ML
.5-1 INJECTION, SOLUTION INTRAMUSCULAR; INTRAVENOUS; SUBCUTANEOUS
Status: DISCONTINUED | OUTPATIENT
Start: 2025-06-09 | End: 2025-06-12 | Stop reason: HOSPADM

## 2025-06-09 RX ORDER — CEFAZOLIN SODIUM 1 G/3ML
INJECTION, POWDER, FOR SOLUTION INTRAMUSCULAR; INTRAVENOUS PRN
Status: DISCONTINUED | OUTPATIENT
Start: 2025-06-09 | End: 2025-06-09 | Stop reason: SURG

## 2025-06-09 RX ORDER — ALBUTEROL SULFATE 5 MG/ML
2.5 SOLUTION RESPIRATORY (INHALATION)
Status: DISCONTINUED | OUTPATIENT
Start: 2025-06-09 | End: 2025-06-09

## 2025-06-09 RX ADMIN — FAMOTIDINE 20 MG: 20 TABLET, FILM COATED ORAL at 17:47

## 2025-06-09 RX ADMIN — FENTANYL CITRATE 100 MCG: 50 INJECTION, SOLUTION INTRAMUSCULAR; INTRAVENOUS at 13:49

## 2025-06-09 RX ADMIN — HYDROMORPHONE HYDROCHLORIDE 0.5 MG: 2 INJECTION INTRAMUSCULAR; INTRAVENOUS; SUBCUTANEOUS at 13:18

## 2025-06-09 RX ADMIN — FENTANYL CITRATE 25 MCG: 50 INJECTION, SOLUTION INTRAMUSCULAR; INTRAVENOUS at 14:32

## 2025-06-09 RX ADMIN — ROCURONIUM BROMIDE 60 MG: 10 INJECTION INTRAVENOUS at 11:45

## 2025-06-09 RX ADMIN — FENTANYL CITRATE 25 MCG: 50 INJECTION, SOLUTION INTRAMUSCULAR; INTRAVENOUS at 14:46

## 2025-06-09 RX ADMIN — FENTANYL CITRATE 100 MCG: 50 INJECTION, SOLUTION INTRAMUSCULAR; INTRAVENOUS at 11:40

## 2025-06-09 RX ADMIN — METHOCARBAMOL 1000 MG: 1000 INJECTION, SOLUTION INTRAMUSCULAR; INTRAVENOUS at 14:37

## 2025-06-09 RX ADMIN — HYDRALAZINE HYDROCHLORIDE 10 MG: 20 INJECTION, SOLUTION INTRAMUSCULAR; INTRAVENOUS at 12:21

## 2025-06-09 RX ADMIN — FENTANYL CITRATE 100 MCG: 50 INJECTION, SOLUTION INTRAMUSCULAR; INTRAVENOUS at 13:40

## 2025-06-09 RX ADMIN — SODIUM CHLORIDE, POTASSIUM CHLORIDE, SODIUM LACTATE AND CALCIUM CHLORIDE: 600; 310; 30; 20 INJECTION, SOLUTION INTRAVENOUS at 17:54

## 2025-06-09 RX ADMIN — HYDROMORPHONE HYDROCHLORIDE 0.2 MG: 1 INJECTION, SOLUTION INTRAMUSCULAR; INTRAVENOUS; SUBCUTANEOUS at 16:00

## 2025-06-09 RX ADMIN — OXYCODONE HYDROCHLORIDE 10 MG: 5 SOLUTION ORAL at 14:30

## 2025-06-09 RX ADMIN — PROPOFOL 150 MG: 10 INJECTION, EMULSION INTRAVENOUS at 11:44

## 2025-06-09 RX ADMIN — HYDROMORPHONE HYDROCHLORIDE 0.2 MG: 1 INJECTION, SOLUTION INTRAMUSCULAR; INTRAVENOUS; SUBCUTANEOUS at 15:30

## 2025-06-09 RX ADMIN — FENTANYL CITRATE 25 MCG: 50 INJECTION, SOLUTION INTRAMUSCULAR; INTRAVENOUS at 14:15

## 2025-06-09 RX ADMIN — CEFAZOLIN 2 G: 1 INJECTION, POWDER, FOR SOLUTION INTRAMUSCULAR; INTRAVENOUS at 11:48

## 2025-06-09 RX ADMIN — FENTANYL CITRATE 25 MCG: 50 INJECTION, SOLUTION INTRAMUSCULAR; INTRAVENOUS at 14:10

## 2025-06-09 RX ADMIN — IPRATROPIUM BROMIDE AND ALBUTEROL SULFATE 3 ML: .5; 2.5 SOLUTION RESPIRATORY (INHALATION) at 14:44

## 2025-06-09 RX ADMIN — ONDANSETRON 8 MG: 2 INJECTION INTRAMUSCULAR; INTRAVENOUS at 13:31

## 2025-06-09 RX ADMIN — HYDROMORPHONE HYDROCHLORIDE 1 MG: 2 INJECTION INTRAMUSCULAR; INTRAVENOUS; SUBCUTANEOUS at 12:07

## 2025-06-09 RX ADMIN — HYDROMORPHONE HYDROCHLORIDE 0.2 MG: 1 INJECTION, SOLUTION INTRAMUSCULAR; INTRAVENOUS; SUBCUTANEOUS at 15:20

## 2025-06-09 RX ADMIN — HYDROMORPHONE HYDROCHLORIDE 0.5 MG: 2 INJECTION INTRAMUSCULAR; INTRAVENOUS; SUBCUTANEOUS at 13:31

## 2025-06-09 RX ADMIN — KETOROLAC TROMETHAMINE 15 MG: 15 INJECTION, SOLUTION INTRAMUSCULAR; INTRAVENOUS at 16:03

## 2025-06-09 RX ADMIN — OXYCODONE 5 MG: 5 TABLET ORAL at 21:45

## 2025-06-09 RX ADMIN — HYDROMORPHONE HYDROCHLORIDE 0.2 MG: 1 INJECTION, SOLUTION INTRAMUSCULAR; INTRAVENOUS; SUBCUTANEOUS at 15:15

## 2025-06-09 RX ADMIN — ONDANSETRON 4 MG: 2 INJECTION INTRAMUSCULAR; INTRAVENOUS at 14:08

## 2025-06-09 RX ADMIN — SUGAMMADEX 200 MG: 100 INJECTION, SOLUTION INTRAVENOUS at 13:37

## 2025-06-09 RX ADMIN — DEXAMETHASONE SODIUM PHOSPHATE 8 MG: 4 INJECTION INTRA-ARTICULAR; INTRALESIONAL; INTRAMUSCULAR; INTRAVENOUS; SOFT TISSUE at 11:50

## 2025-06-09 SDOH — ECONOMIC STABILITY: TRANSPORTATION INSECURITY
IN THE PAST 12 MONTHS, HAS LACK OF RELIABLE TRANSPORTATION KEPT YOU FROM MEDICAL APPOINTMENTS, MEETINGS, WORK OR FROM GETTING THINGS NEEDED FOR DAILY LIVING?: NO

## 2025-06-09 SDOH — ECONOMIC STABILITY: TRANSPORTATION INSECURITY
IN THE PAST 12 MONTHS, HAS THE LACK OF TRANSPORTATION KEPT YOU FROM MEDICAL APPOINTMENTS OR FROM GETTING MEDICATIONS?: NO

## 2025-06-09 ASSESSMENT — COGNITIVE AND FUNCTIONAL STATUS - GENERAL
SUGGESTED CMS G CODE MODIFIER DAILY ACTIVITY: CI
CLIMB 3 TO 5 STEPS WITH RAILING: A LITTLE
MOBILITY SCORE: 21
TURNING FROM BACK TO SIDE WHILE IN FLAT BAD: A LITTLE
DAILY ACTIVITIY SCORE: 23
DRESSING REGULAR LOWER BODY CLOTHING: A LITTLE
MOVING FROM LYING ON BACK TO SITTING ON SIDE OF FLAT BED: A LITTLE
SUGGESTED CMS G CODE MODIFIER MOBILITY: CJ

## 2025-06-09 ASSESSMENT — PATIENT HEALTH QUESTIONNAIRE - PHQ9
2. FEELING DOWN, DEPRESSED, IRRITABLE, OR HOPELESS: NOT AT ALL
1. LITTLE INTEREST OR PLEASURE IN DOING THINGS: NOT AT ALL
SUM OF ALL RESPONSES TO PHQ9 QUESTIONS 1 AND 2: 0

## 2025-06-09 ASSESSMENT — LIFESTYLE VARIABLES
EVER FELT BAD OR GUILTY ABOUT YOUR DRINKING: NO
DOES PATIENT WANT TO STOP DRINKING: NO
TOTAL SCORE: 1
AVERAGE NUMBER OF DAYS PER WEEK YOU HAVE A DRINK CONTAINING ALCOHOL: 1
HAVE PEOPLE ANNOYED YOU BY CRITICIZING YOUR DRINKING: NO
HOW MANY TIMES IN THE PAST YEAR HAVE YOU HAD 5 OR MORE DRINKS IN A DAY: 0
HAVE YOU EVER FELT YOU SHOULD CUT DOWN ON YOUR DRINKING: YES
EVER HAD A DRINK FIRST THING IN THE MORNING TO STEADY YOUR NERVES TO GET RID OF A HANGOVER: NO
ALCOHOL_USE: YES
TOTAL SCORE: 1
TOTAL SCORE: 1
ON A TYPICAL DAY WHEN YOU DRINK ALCOHOL HOW MANY DRINKS DO YOU HAVE: 1
CONSUMPTION TOTAL: NEGATIVE

## 2025-06-09 ASSESSMENT — PAIN DESCRIPTION - PAIN TYPE
TYPE: SURGICAL PAIN
TYPE: SURGICAL PAIN

## 2025-06-09 ASSESSMENT — SOCIAL DETERMINANTS OF HEALTH (SDOH)
WITHIN THE LAST YEAR, HAVE YOU BEEN KICKED, HIT, SLAPPED, OR OTHERWISE PHYSICALLY HURT BY YOUR PARTNER OR EX-PARTNER?: NO
WITHIN THE PAST 12 MONTHS, THE FOOD YOU BOUGHT JUST DIDN'T LAST AND YOU DIDN'T HAVE MONEY TO GET MORE: NEVER TRUE
IN THE PAST 12 MONTHS, HAS THE ELECTRIC, GAS, OIL, OR WATER COMPANY THREATENED TO SHUT OFF SERVICE IN YOUR HOME?: NO
WITHIN THE LAST YEAR, HAVE YOU BEEN AFRAID OF YOUR PARTNER OR EX-PARTNER?: NO
WITHIN THE PAST 12 MONTHS, YOU WORRIED THAT YOUR FOOD WOULD RUN OUT BEFORE YOU GOT THE MONEY TO BUY MORE: NEVER TRUE
WITHIN THE LAST YEAR, HAVE TO BEEN RAPED OR FORCED TO HAVE ANY KIND OF SEXUAL ACTIVITY BY YOUR PARTNER OR EX-PARTNER?: NO
WITHIN THE LAST YEAR, HAVE YOU BEEN HUMILIATED OR EMOTIONALLY ABUSED IN OTHER WAYS BY YOUR PARTNER OR EX-PARTNER?: NO

## 2025-06-09 ASSESSMENT — PAIN SCALES - GENERAL: PAIN_LEVEL: 1

## 2025-06-09 NOTE — PROGRESS NOTES
Medication history reviewed with PT at bedside    [facility] is complete per PT reporting    Allergies reviewed.     Patient denies any outpatient antibiotics in the last 30 days.     Patient is not taking anticoagulants.    Dispense history is available in [employer].    Preferred pharmacy for this encounter - [facility] [address] ([phone])

## 2025-06-09 NOTE — ANESTHESIA PROCEDURE NOTES
Airway    Date/Time: 6/9/2025 11:47 AM    Performed by: Marcell Zamora M.D.  Authorized by: Marcell Zamora M.D.    Location:  OR  Urgency:  Elective  Indications for Airway Management:  Anesthesia      Spontaneous Ventilation: absent    Sedation Level:  Deep  Preoxygenated: Yes    Patient Position:  Sniffing  Mask Difficulty Assessment:  2 - vent by mask + OA or adjuvant +/- NMBA  Final Airway Type:  Endotracheal airway  Final Endotracheal Airway:  ETT - double lumen right with ONE LUNG VENTILATION  Cuffed: Yes    Technique Used for Successful ETT Placement:  Direct laryngoscopy    Insertion Site:  Oral  Blade Type:  Lara  Laryngoscope Blade/Videolaryngoscope Blade Size:  3  ETT Double Lumen (fr):  37  Measured from:  Gums  Placement Verified by: auscultation and capnometry    Cormack-Lehane Classification:  Grade I - full view of glottis  Number of Attempts at Approach:  1   SIVI, ATET, tube placement confirmed with fiberscope

## 2025-06-09 NOTE — ANESTHESIA TIME REPORT
Anesthesia Start and Stop Event Times       Date Time Event    6/9/2025 1115 Ready for Procedure     1138 Anesthesia Start     1354 Anesthesia Stop          Responsible Staff  06/09/25      Name Role Begin End    Marcell Zamora M.D. Anesth 1138 1358          Overtime Reason:  no overtime (within assigned shift)    Comments:

## 2025-06-09 NOTE — ANESTHESIA POSTPROCEDURE EVALUATION
Patient: Hanna Melo    Procedure Summary       Date: 06/09/25 Room / Location: [place] [address] / SURGERY [facility]    Anesthesia Start: 1138 Anesthesia Stop: 1354    Procedures:       BRONCHOSCOPY, LEFT THORACOTOMY WITH SLEEVE RESECTION OF BRONCHIAL TUMOR AND NODE SAMPLING (Chest)      BRONCHOSCOPY (Mouth) Diagnosis: (LUNG HAMARTOMA)    Surgeons: John H Ganser, M.D. Responsible Provider: Marcell Zamora M.D.    Anesthesia Type: general ASA Status: 3            Final Anesthesia Type: general  Last vitals  BP   Blood Pressure: 122/87    Temp   36.6 °C (97.8 °F)    Pulse   83   Resp   16    SpO2   96 %      Anesthesia Post Evaluation    Patient location during evaluation: PACU  Patient participation: complete - patient participated  Level of consciousness: awake and alert  Pain score: 1    Airway patency: patent  Anesthetic complications: no  Cardiovascular status: hemodynamically stable  Respiratory status: acceptable  Hydration status: euvolemic    PONV: none          No notable events documented.     Nurse Pain Score: 0 (NPRS)

## 2025-06-09 NOTE — PROGRESS NOTES
4 Eyes Skin Assessment Completed by MAGGI Gu and ADINA Cherry.    Skin assessment is primarily focused on high risk bony prominences. Pay special attention to skin beneath and around medical devices, high risk bony prominences, skin to skin areas and areas where the patient lacks sensation to feel pain and areas where the patient previously had breakdown.     Head (Occipital):  WDL   Ears (Under Medical Devices): WDL   Nose (Under Medical Devices): WDL   Mouth:  WDL   Neck: WDL   Breast/Chest:  Incision   Shoulder Blades:  WDL   Spine:   WDL   (R) Arm/Elbow/Hand: PIV   (L) Arm/Elbow/Hand: WDL   Abdomen: WDL   Pannus/Groin:  Red and Blanching   Sacrum/Coccyx:   WDL   (R) Ischial Tuberosity (Sit Bones):  WDL   (L) Ischial Tuberosity (Sit Bones):  WDL   (R) Leg:  WDL   (L) Leg:  WDL   (R) Heel:  WDL   (R) Foot/Toe: WDL   (L) Heel: WDL   (L) Foot/Toe:  WDL       DEVICES IN USE:   Respiratory Devices:  Oxygen mask  Feeding Devices:  N/A   Lines & BP Monitoring Devices:  Peripheral IV and Pulse ox    Orthopedic Devices:  N/A  Miscellaneous Devices:  Chest tube(s) and Jenkins    PROTOCOL INTERVENTIONS:   Standard/Trauma Bed:  Already in place  Jenkins:  Already in place  Oxygen Mask with Square Gray Foams:  Already in place    WOUND PHOTOS:   N/A no wounds identified    WOUND CONSULT:   N/A, no advanced wound care needs identified

## 2025-06-09 NOTE — PROGRESS NOTES
Report called to floor. Patient to floor with transport in stable condition. VSS. Surgical dressings clean dry intact. Aox4 and on 10 l O2 per ERAS. No belongings. Family updated. No further needs.

## 2025-06-09 NOTE — OR SURGEON
Immediate Post OP Note    PreOp Diagnosis: Endobronchial hamartoma left       PostOp Diagnosis: Same      Procedure(s):  BRONCHOSCOPY, LEFT THORACOTOMY WITH SLEEVE RESECTION OF BRONCHIAL TUMOR AND NODE SAMPLING - Wound Class: Clean Contaminated  BRONCHOSCOPY - Wound Class: Clean Contaminated    Surgeon(s):  John H Ganser, M.D.    Anesthesiologist/Type of Anesthesia:  Anesthesiologist: Marcell Zamora M.D./General    Surgical Staff:  Assistant: MERCEDES Daly  Circulator: Bharath Bruno RRONA  Relief Circulator: Schuyler Dow RRONA; Arlene Ochoa R.N.  Scrub Person: Temitope Hilario    Specimens removed if any:  ID Type Source Tests Collected by Time Destination   A : Hilar Node, Station 10 Tissue Lymph Node PATHOLOGY SPECIMEN John H Ganser, M.D. 6/9/2025 12:26 PM    B : Station 7, Subcarinal Node Tissue Lymph Node PATHOLOGY SPECIMEN John H Ganser, M.D. 6/9/2025 12:28 PM    C : Station 11, Parabroncial Node Tissue Lymph Node PATHOLOGY SPECIMEN John H Ganser, M.D. 6/9/2025 12:32 PM    D : Endobronchial Tumor Tissue Lung PATHOLOGY SPECIMEN John H Ganser, M.D. 6/9/2025 12:40 PM        Estimated Blood Loss: 250 ml    Findings: Mass distal left main removed with sleeve resection    Complications: 0        6/9/2025 1:47 PM John H Ganser, M.D.

## 2025-06-09 NOTE — ANESTHESIA PREPROCEDURE EVALUATION
Case: [id number] Date/Time: 06/09/25 1200    Procedures:       BRONCHOSCOPY, LEFT THORACOTOMY WITH SLEEVE RESECTION OF BRONCHIAL TUMOR, POSSIBLE LOBECTOMY OR PNEUMECTOMY      BRONCHOSCOPY    Pre-op diagnosis: LUNG HAMARTOMA    Location: [facility] OR 14 / SURGERY [facility]    Surgeons: John H Ganser, M.D.            Relevant Problems   ANESTHESIA   (positive) Obstructive sleep apnea      PULMONARY   (positive) Dyspnea on exertion      CARDIAC   (positive) Dyspnea on exertion       Physical Exam    Airway   Mallampati: II  TM distance: >3 FB  Neck ROM: full       Cardiovascular - normal exam  Rhythm: regular  Rate: normal    (-) murmur     Dental - normal exam    (+) upper dentures, lower dentures           Pulmonary (+) wheezes     Abdominal    Neurological - normal exam                   Anesthesia Plan    ASA 3   ASA physical status 3 criteria: COPD - poorly controlled and morbid obesity - BMI greater than or equal to 40    Plan - general       Airway plan will be ETT    (Surgeon requests right MICKI. Pt refuses nebulizers preop. Risks discussed, wishes to proceed)      Induction: intravenous    Postoperative Plan: Postoperative administration of opioids is intended.    Pertinent diagnostic labs and testing reviewed    Informed Consent:    Anesthetic plan and risks discussed with patient.    Use of blood products discussed with: patient whom consented to blood products.

## 2025-06-10 ENCOUNTER — APPOINTMENT (OUTPATIENT)
Dept: RADIOLOGY | Facility: MEDICAL CENTER | Age: 55
DRG: 164 | End: 2025-06-10
Attending: PHYSICIAN ASSISTANT
Payer: COMMERCIAL

## 2025-06-10 LAB
ANION GAP SERPL CALC-SCNC: 11 MMOL/L (ref 7–16)
BUN SERPL-MCNC: 12 MG/DL (ref 8–22)
CALCIUM SERPL-MCNC: 8.9 MG/DL (ref 8.5–10.5)
CHLORIDE SERPL-SCNC: 98 MMOL/L (ref 96–112)
CO2 SERPL-SCNC: 25 MMOL/L (ref 20–33)
CREAT SERPL-MCNC: 0.69 MG/DL (ref 0.5–1.4)
ERYTHROCYTE [DISTWIDTH] IN BLOOD BY AUTOMATED COUNT: 46.5 FL (ref 35.9–50)
GFR SERPLBLD CREATININE-BSD FMLA CKD-EPI: 102 ML/MIN/1.73 M 2
GLUCOSE SERPL-MCNC: 137 MG/DL (ref 65–99)
HCT VFR BLD AUTO: 41.2 % (ref 37–47)
HGB BLD-MCNC: 13.2 G/DL (ref 12–16)
MCH RBC QN AUTO: 30.3 PG (ref 27–33)
MCHC RBC AUTO-ENTMCNC: 32 G/DL (ref 32.2–35.5)
MCV RBC AUTO: 94.7 FL (ref 81.4–97.8)
PLATELET # BLD AUTO: 140 K/UL (ref 164–446)
PLATELET BLD QL SMEAR: NORMAL
PMV BLD AUTO: 10.5 FL (ref 9–12.9)
POTASSIUM SERPL-SCNC: 4.4 MMOL/L (ref 3.6–5.5)
RBC # BLD AUTO: 4.35 M/UL (ref 4.2–5.4)
SODIUM SERPL-SCNC: 134 MMOL/L (ref 135–145)
WBC # BLD AUTO: 11.4 K/UL (ref 4.8–10.8)

## 2025-06-10 PROCEDURE — 700102 HCHG RX REV CODE 250 W/ 637 OVERRIDE(OP): Performed by: PHYSICIAN ASSISTANT

## 2025-06-10 PROCEDURE — 700111 HCHG RX REV CODE 636 W/ 250 OVERRIDE (IP): Mod: JZ | Performed by: PHYSICIAN ASSISTANT

## 2025-06-10 PROCEDURE — 80048 BASIC METABOLIC PNL TOTAL CA: CPT

## 2025-06-10 PROCEDURE — A9270 NON-COVERED ITEM OR SERVICE: HCPCS | Performed by: PHYSICIAN ASSISTANT

## 2025-06-10 PROCEDURE — 770001 HCHG ROOM/CARE - MED/SURG/GYN PRIV*

## 2025-06-10 PROCEDURE — 700105 HCHG RX REV CODE 258: Performed by: PHYSICIAN ASSISTANT

## 2025-06-10 PROCEDURE — 85027 COMPLETE CBC AUTOMATED: CPT

## 2025-06-10 PROCEDURE — 36415 COLL VENOUS BLD VENIPUNCTURE: CPT

## 2025-06-10 PROCEDURE — 94669 MECHANICAL CHEST WALL OSCILL: CPT

## 2025-06-10 PROCEDURE — 71045 X-RAY EXAM CHEST 1 VIEW: CPT

## 2025-06-10 RX ADMIN — HYDROMORPHONE HYDROCHLORIDE 1 MG: 1 INJECTION, SOLUTION INTRAMUSCULAR; INTRAVENOUS; SUBCUTANEOUS at 04:23

## 2025-06-10 RX ADMIN — FAMOTIDINE 20 MG: 20 TABLET, FILM COATED ORAL at 17:32

## 2025-06-10 RX ADMIN — HYDROMORPHONE HYDROCHLORIDE 1 MG: 1 INJECTION, SOLUTION INTRAMUSCULAR; INTRAVENOUS; SUBCUTANEOUS at 15:11

## 2025-06-10 RX ADMIN — KETOROLAC TROMETHAMINE 15 MG: 15 INJECTION, SOLUTION INTRAMUSCULAR; INTRAVENOUS at 18:46

## 2025-06-10 RX ADMIN — HYDROMORPHONE HYDROCHLORIDE 1 MG: 1 INJECTION, SOLUTION INTRAMUSCULAR; INTRAVENOUS; SUBCUTANEOUS at 20:15

## 2025-06-10 RX ADMIN — SODIUM CHLORIDE, POTASSIUM CHLORIDE, SODIUM LACTATE AND CALCIUM CHLORIDE: 600; 310; 30; 20 INJECTION, SOLUTION INTRAVENOUS at 03:18

## 2025-06-10 RX ADMIN — FAMOTIDINE 20 MG: 20 TABLET, FILM COATED ORAL at 05:44

## 2025-06-10 RX ADMIN — KETOROLAC TROMETHAMINE 15 MG: 15 INJECTION, SOLUTION INTRAMUSCULAR; INTRAVENOUS at 08:10

## 2025-06-10 RX ADMIN — LEVOTHYROXINE SODIUM 75 MCG: 0.05 TABLET ORAL at 05:44

## 2025-06-10 RX ADMIN — OXYCODONE HYDROCHLORIDE 10 MG: 10 TABLET ORAL at 13:10

## 2025-06-10 RX ADMIN — HYDROMORPHONE HYDROCHLORIDE 1 MG: 1 INJECTION, SOLUTION INTRAMUSCULAR; INTRAVENOUS; SUBCUTANEOUS at 00:27

## 2025-06-10 RX ADMIN — OXYCODONE HYDROCHLORIDE 10 MG: 10 TABLET ORAL at 03:15

## 2025-06-10 RX ADMIN — OXYCODONE HYDROCHLORIDE 10 MG: 10 TABLET ORAL at 08:11

## 2025-06-10 RX ADMIN — ENOXAPARIN SODIUM 40 MG: 100 INJECTION SUBCUTANEOUS at 08:10

## 2025-06-10 RX ADMIN — EZETIMIBE 10 MG: 10 TABLET ORAL at 08:09

## 2025-06-10 RX ADMIN — OXYCODONE HYDROCHLORIDE 10 MG: 10 TABLET ORAL at 17:32

## 2025-06-10 ASSESSMENT — PAIN DESCRIPTION - PAIN TYPE
TYPE: ACUTE PAIN;SURGICAL PAIN
TYPE: ACUTE PAIN
TYPE: ACUTE PAIN;SURGICAL PAIN
TYPE: ACUTE PAIN
TYPE: ACUTE PAIN;SURGICAL PAIN
TYPE: SURGICAL PAIN
TYPE: ACUTE PAIN
TYPE: SURGICAL PAIN
TYPE: SURGICAL PAIN
TYPE: ACUTE PAIN;SURGICAL PAIN
TYPE: SURGICAL PAIN
TYPE: ACUTE PAIN
TYPE: SURGICAL PAIN

## 2025-06-10 NOTE — CARE PLAN
The patient is Stable - Low risk of patient condition declining or worsening    Shift Goals  Clinical Goals: Pain management, monitor chest tube    Progress made toward(s) clinical / shift goals:  Patient able to manage pain with medications in the MAR and non-pharm interventions. CT remained patent and functioning. Patient able to verbalize need to use IS and plan of car.     Patient is not progressing towards the following goals:

## 2025-06-10 NOTE — PROGRESS NOTES
Virtual Nurse rounding and VRN portion of admission complete.    Round Needs: No needs at this time.

## 2025-06-10 NOTE — PROGRESS NOTES
"Assumed care of patient at 1845. Bedside report received. Assessment complete.  AA&Ox4. Denies CP/SOB.  Reporting 3/10 pain. Medicated per MAR. Educated patient regarding pharmacologic and non pharmacologic modalities for pain management.  Skin per flowsheet.  Tolerating Regular diet. Denies N/V.  + void. - BM. Last BM 6/8 PTA  Pt ambulates x1 with FWW    All needs met at this time. Call light within reach. Pt calls appropriately. Bed low and locked, non skid socks in place. Hourly rounding in place.    /72   Pulse 91   Temp 36.8 °C (98.2 °F) (Temporal)   Resp 16   Ht 1.651 m (5' 5\")   Wt 114 kg (252 lb 3.3 oz)   SpO2 94%   BMI 41.97 kg/m²     "

## 2025-06-10 NOTE — DIETARY
NUTRITION SERVICES: BMI - Pt with BMI >40 (=Body mass index is 41.97 kg/m².), class III obesity. Weight loss counseling not appropriate in acute care setting.     RECOMMEND - If appropriate at DC please refer to outpatient nutrition services for weight management.

## 2025-06-10 NOTE — PROGRESS NOTES
Virtual Nurse rounding complete.    Round Needs: Patient sitting up EOB. C/o pain 7/10 to L flank. Bedside RN notified via voalte.

## 2025-06-10 NOTE — PROGRESS NOTES
Received report from PAC-U.  Pt arrived to T422 via bed  No immediate distress.  A&Ox4  States pain is controlled  Denies n/v  Jenkins to down drain with yellow urine present  Left Chest tube in place to -20 suction, sanguinous drainage present.   10L O2 Oxy mask ERAS   Oriented to room, educated on welcome packet, white board, TV, call light, call before fall, and plan of care  Call light and personal belongings within reach.  Fall precautions and hourly rounding in place  rounding in place  Family at bedside

## 2025-06-10 NOTE — PROGRESS NOTES
"Progress Note:    S: Doing well  Sitting bedside  Using IS    O:  Recent Labs     06/10/25  0106   WBC 11.4*   RBC 4.35   HEMOGLOBIN 13.2   HEMATOCRIT 41.2   MCV 94.7   MCH 30.3   MCHC 32.0*   RDW 46.5   PLATELETCT 140*   MPV 10.5     Recent Labs     06/10/25  0531   SODIUM 134*   POTASSIUM 4.4   CHLORIDE 98   CO2 25   GLUCOSE 137*   BUN 12   CREATININE 0.69   CALCIUM 8.9         Current Facility-Administered Medications   Medication Dose    ezetimibe (Zetia) tablet 10 mg  10 mg    levothyroxine (Synthroid) tablet 75 mcg  75 mcg    lactated ringers infusion      enoxaparin (Lovenox) inj 40 mg  40 mg    ondansetron (Zofran) syringe/vial injection 4 mg  4 mg    diphenhydrAMINE (Benadryl) injection 25 mg  25 mg    diphenhydrAMINE (Benadryl) tablet/capsule 25 mg  25 mg    Or    diphenhydrAMINE (Benadryl) injection 25 mg  25 mg    benzocaine-menthol (Cepacol) lozenge 1 Lozenge  1 Lozenge    HYDROmorphone (Dilaudid) injection 0.5-1 mg  0.5-1 mg    methocarbamol (Robaxin) injection 1,000 mg  1,000 mg    oxyCODONE immediate-release (Roxicodone) tablet 5 mg  5 mg    Or    oxyCODONE immediate release (Roxicodone) tablet 10 mg  10 mg    enalaprilat injection 2.5 mg 2 mL  2.5 mg    famotidine (Pepcid) tablet 20 mg  20 mg    Or    famotidine (Pepcid) injection 20 mg  20 mg    hydrALAZINE (Apresoline) injection 10 mg  10 mg    ondansetron (Zofran ODT) dispertab 4 mg  4 mg    Respiratory Therapy Consult      ketorolac (Toradol) 15 MG/ML injection 15 mg  15 mg       PE:  /64   Pulse 92   Temp 36.8 °C (98.2 °F) (Temporal)   Resp 18   Ht 1.651 m (5' 5\")   Wt 114 kg (252 lb 3.3 oz)   SpO2 93%     Intake/Output Summary (Last 24 hours) at 6/10/2025 0851  Last data filed at 6/10/2025 0839  Gross per 24 hour   Intake 240 ml   Output 1185 ml   Net -945 ml       Alert, NAD  Chest clear  Chest tube: minimal serous, no air leak    Rads:  DX-CHEST-PORTABLE (1 VIEW)   Final Result         1.  Pulmonary edema and/or infiltrates, " similar to prior study.   2.  Trace left pneumothorax with thoracostomy tube in place   3.  Cardiomegaly      DX-CHEST-LIMITED (1 VIEW)   Final Result      1.  Interval placement of left chest tube which extends into the left lung apex.   2.  Mild to moderate cardiomegaly.   3.  Increased interstitial markings in the right lung consistent with interstitial edema, atelectasis, and/or pneumonitis.          A:   Active Hospital Problems    Diagnosis     Lung nodules [R91.8]    Endobronchial hamartoma      P: Continue chest tube 1 more day  Ambulate/IS  Await path  D/C Foley John Ganser M.D.  [facility]

## 2025-06-10 NOTE — CARE PLAN
Problem: Hyperinflation  Goal: Prevent or improve atelectasis  Description: Target End Date:  3 to 4 days1. Instruct incentive spirometry usage2.  Perform hyperinflation therapy as indicated  Outcome: Progressing   PEP QID  IS [id number]

## 2025-06-10 NOTE — OP REPORT
DATE OF SERVICE:  06/09/2025     PREOPERATIVE DIAGNOSIS:  Endobronchial hematoma, distal left main bronchus.     POSTOPERATIVE DIAGNOSIS:  Endobronchial hematoma, distal left main bronchus.     PROCEDURE:  Left thoracotomy with sleeve resection of bronchus with primary   repair.     SURGEON:  John H. Ganser, MD     ASSISTANT:  Mikal Cortez PA-C     ANESTHESIA:  General     ANESTHESIOLOGIST:  Marcell Zamora MD     INDICATIONS:  The patient is a 54-year-old female with progressively worsening   shortness of breath requiring supplemental oxygen recently.  She had   extensive wheezing and Pulmonology did a bronchoscopy showing an endobronchial   mass including the distal main bronchus.  Biopsy showed a hematoma.  We   discussed risks, benefits, and alternatives to left thoracotomy and possible   sleeve resection without removing any lung, but we did discuss the possibility   of either lobectomy or pneumonectomy being required.  All questions were   answered and she wished to proceed.     FINDINGS:  On bronchoscopy, the mass appeared to be emanating from the   anterolateral aspect of the distal main bronchus right at the bifurcation.  It   was felt that this would be amenable to sleeve resection.  The thoracotomy, I   was able to just remove and area of bronchus around the tumor completely   excising it and then I was able to do a primary repair of the bronchus.     DESCRIPTION OF PROCEDURE:  The patient identified, general anesthetic   administered with a double lumen endotracheal tube.  Through the endobronchial   tube, I did a thorough bronchoscopy, inspecting the mass in the distal main   bronchus and as noted above, it appeared amenable to a sleeve resection.  She   was placed in a right lateral decubitus position.  Her left chest was prepped   and draped in the usual sterile fashion.  A posterolateral thoracotomy   incision was made.  The chest entered the 5th intercostal space and a portion   of the 6th rib  removed and the Finochietto rib  inserted.  There was   an adhesion at the apex that was quite vascular and this was controlled with   hemoclips on the chest wall side.     The lung was rotated anteriorly and the hilar pleura was divided from the apex   down to the inferior pulmonary vein.  I completely dissected out all the way   almost to the che.  I was able to separate the artery away from the   bronchus as well.  Several nodes in this area were sent as station 10 and   station 11 nodes.  I was then able to dissect distally to the bifurcation of   the upper lobe and lower lobe.  Palpation confirmed the mass in the distal   main stem right at the bifurcation.  I then opened the bronchus just medially   overlying the mass on the membranous side first.  It appeared that it was   emanating from the anterolateral aspect of the distal main bronchus right at   the upper lobe takeoff.  I then removed a small portion of the airway around   the base of the tumor leaving the medial and anterior wall of the bronchus   intact.  Hemostasis was assured.  The bronchus was then closed transversely   with interrupted 3-0 Vicryl sutures and came together under no tension.     The lung was re-expanded and both lobes expanded nicely with no air leak.  I   then repeated the bronchoscopy and there was some blood in the airway, but it   did appear that both upper and lower lobe airways were patent.     The chest was irrigated.  I coated the suture line with fibrin glue using   VistaSeal and also placed some of this over the area where there was bleeding   in the upper chest, which appeared well controlled.  A 19-Serbian Donnell drain   was then passed through a separate incision, angled towards the apex and   secured to the skin with silk.  The lung was again re-expanded and again both   lobes expanded very nicely.  The ribs were then reapproximated with   interrupted #1 Vicryl periocostal sutures, the muscle was closed in two  layers   with 0 Vicryl and skin with staples.  Sterile dressings were applied and the   tube attached to a pleural selection device and the patient returned to   recovery in stable condition.     An assistant was required in this case due to the complexity of the procedure.    The role included retraction, suctioning, cutting sutures, and assistance   with closure of the thoracotomy wound.        ______________________________  JOHN H. GANSER, MD [facility]/MARY    DD:  06/09/2025 13:57  DT:  06/09/2025 17:15    Job#:  [id number]    CC:DO Cuauhtemoc De II, MD

## 2025-06-11 PROCEDURE — A9270 NON-COVERED ITEM OR SERVICE: HCPCS | Performed by: PHYSICIAN ASSISTANT

## 2025-06-11 PROCEDURE — 94669 MECHANICAL CHEST WALL OSCILL: CPT

## 2025-06-11 PROCEDURE — 700105 HCHG RX REV CODE 258: Performed by: PHYSICIAN ASSISTANT

## 2025-06-11 PROCEDURE — 700111 HCHG RX REV CODE 636 W/ 250 OVERRIDE (IP): Mod: JZ | Performed by: PHYSICIAN ASSISTANT

## 2025-06-11 PROCEDURE — 770001 HCHG ROOM/CARE - MED/SURG/GYN PRIV*

## 2025-06-11 PROCEDURE — 700102 HCHG RX REV CODE 250 W/ 637 OVERRIDE(OP): Performed by: PHYSICIAN ASSISTANT

## 2025-06-11 PROCEDURE — 94760 N-INVAS EAR/PLS OXIMETRY 1: CPT

## 2025-06-11 RX ADMIN — ONDANSETRON 4 MG: 2 INJECTION INTRAMUSCULAR; INTRAVENOUS at 09:58

## 2025-06-11 RX ADMIN — HYDROMORPHONE HYDROCHLORIDE 0.5 MG: 1 INJECTION, SOLUTION INTRAMUSCULAR; INTRAVENOUS; SUBCUTANEOUS at 05:59

## 2025-06-11 RX ADMIN — LEVOTHYROXINE SODIUM 75 MCG: 0.05 TABLET ORAL at 04:28

## 2025-06-11 RX ADMIN — OXYCODONE HYDROCHLORIDE 10 MG: 10 TABLET ORAL at 00:20

## 2025-06-11 RX ADMIN — HYDROMORPHONE HYDROCHLORIDE 1 MG: 1 INJECTION, SOLUTION INTRAMUSCULAR; INTRAVENOUS; SUBCUTANEOUS at 09:55

## 2025-06-11 RX ADMIN — OXYCODONE HYDROCHLORIDE 10 MG: 10 TABLET ORAL at 08:17

## 2025-06-11 RX ADMIN — EZETIMIBE 10 MG: 10 TABLET ORAL at 08:17

## 2025-06-11 RX ADMIN — OXYCODONE HYDROCHLORIDE 10 MG: 10 TABLET ORAL at 23:41

## 2025-06-11 RX ADMIN — SODIUM CHLORIDE, POTASSIUM CHLORIDE, SODIUM LACTATE AND CALCIUM CHLORIDE: 600; 310; 30; 20 INJECTION, SOLUTION INTRAVENOUS at 15:09

## 2025-06-11 RX ADMIN — METHOCARBAMOL 1000 MG: 1000 INJECTION, SOLUTION INTRAMUSCULAR; INTRAVENOUS at 12:49

## 2025-06-11 RX ADMIN — KETOROLAC TROMETHAMINE 15 MG: 15 INJECTION, SOLUTION INTRAMUSCULAR; INTRAVENOUS at 14:02

## 2025-06-11 RX ADMIN — FAMOTIDINE 20 MG: 20 TABLET, FILM COATED ORAL at 04:28

## 2025-06-11 RX ADMIN — OXYCODONE HYDROCHLORIDE 10 MG: 10 TABLET ORAL at 04:12

## 2025-06-11 RX ADMIN — KETOROLAC TROMETHAMINE 15 MG: 15 INJECTION, SOLUTION INTRAMUSCULAR; INTRAVENOUS at 20:08

## 2025-06-11 RX ADMIN — HYDROMORPHONE HYDROCHLORIDE 1 MG: 1 INJECTION, SOLUTION INTRAMUSCULAR; INTRAVENOUS; SUBCUTANEOUS at 01:39

## 2025-06-11 RX ADMIN — ENOXAPARIN SODIUM 40 MG: 100 INJECTION SUBCUTANEOUS at 04:28

## 2025-06-11 ASSESSMENT — ENCOUNTER SYMPTOMS
CHILLS: 0
NAUSEA: 0
FEVER: 0
VOMITING: 0

## 2025-06-11 ASSESSMENT — PAIN DESCRIPTION - PAIN TYPE
TYPE: SURGICAL PAIN
TYPE: SURGICAL PAIN
TYPE: ACUTE PAIN
TYPE: SURGICAL PAIN
TYPE: SURGICAL PAIN
TYPE: ACUTE PAIN
TYPE: SURGICAL PAIN
TYPE: ACUTE PAIN
TYPE: SURGICAL PAIN
TYPE: SURGICAL PAIN

## 2025-06-11 NOTE — DOCUMENTATION QUERY
[facility]                                                                       Query Response Note      PATIENT:               SIMEON ZAVALA  ACCT #:                  [id number]  MRN:                     [id number]  :                      1970  ADMIT DATE:       2025 10:13 AM  DISCH DATE:          RESPONDING  PROVIDER #:        [id number]           QUERY TEXT:    BMI >40 is documented in the Medical Record.      Please clarify the clinical relevance for the clinical/diagnostic findings stated below:    The patient's Clinical Indicators include:   Flowsheet: Weight 114.4 kg, Weight Source Stand Up Scale, BMI (Calculated) 41.97    Risk Factors: 54 y.o. female with JERILYN, hypothyroidism, BMI 41.97.  Treatment: RD consult, Synthroid.    Thank you,  RIKA Hernandes, RN, CCDS, CCS  Clinical    Connect via [employer] or [email or web address]    Note: If you agree with a diagnosis, please remember to include it in your concurrent daily documentation and discharge summary.  Options provided:   -- Overweight   -- Obesity Class 1   -- Obesity Class 2   -- Obesity Class 3   -- Findings of no clinical significance   -- Other explanation, (please specify other explanation)      Query created by: Staci Navarrete on 2025 7:58 AM    RESPONSE TEXT:    Obesity Class 3          Electronically signed by:  JOHN H GANSER MD 2025 11:57 AM

## 2025-06-11 NOTE — PROGRESS NOTES
"Assumed care of patient at 1845. Bedside report received. Assessment complete.  AA&Ox4. Denies CP/SOB.  Reporting 7/10 pain. Medicated per MAR. Educated patient regarding pharmacologic and non pharmacologic modalities for pain management.  Skin per flowsheet.  Tolerating Regular diet. Denies N/V.  + void. - BM. Last BM 6/8  Pt ambulates SBA    All needs met at this time. Call light within reach. Pt calls appropriately. Bed low and locked, non skid socks in place. Hourly rounding in place.    BP (!) 134/92   Pulse 90   Temp 36.5 °C (97.7 °F) (Temporal)   Resp 18   Ht 1.651 m (5' 5\")   Wt 114 kg (252 lb 3.3 oz)   SpO2 93%   BMI 41.97 kg/m²     "

## 2025-06-11 NOTE — PROGRESS NOTES
Surgical Progress Note    Author: MERCEDES Daly Date & Time created: 2025   11:07 AM     Interval Events:  S/p Left thoracotomy with sleeve resection of bronchus with primary repair - POD#2. Pt having significant pain from thoracotomy, requiring IV med's for control. Tolerating po, using IS.  Daughter at bedside today.    Review of Systems   Constitutional:  Negative for chills and fever.   Respiratory:          + surgical/incisional pain   Gastrointestinal:  Negative for nausea and vomiting.   Skin:  Negative for itching and rash.     Hemodynamics:  Temp (24hrs), Av.9 °C (98.4 °F), Min:36.5 °C (97.7 °F), Max:37.4 °C (99.4 °F)  Temperature: 37.2 °C (99 °F)  Pulse  Av.4  Min: 75  Max: 109   Blood Pressure: (!) 143/94     Respiratory:    Respiration: 20, Pulse Oximetry: 91 %     Work Of Breathing / Effort: Shallow  RUL Breath Sounds: Diminished, RML Breath Sounds: Diminished, RLL Breath Sounds: Diminished, KIMI Breath Sounds: Diminished, LLL Breath Sounds: Diminished  Neuro:  GCS       Fluids:    Intake/Output Summary (Last 24 hours) at 2025 1107  Last data filed at 2025 0752  Gross per 24 hour   Intake 480 ml   Output 220 ml   Net 260 ml        Current Diet Order   Procedures    Diet Order Diet: Regular     Physical Exam  Vitals and nursing note reviewed.   Constitutional:       General: She is not in acute distress.  Cardiovascular:      Rate and Rhythm: Normal rate.   Pulmonary:      Effort: Pulmonary effort is normal. No respiratory distress.      Comments: Chest tube in place, ~ 250ml serosang output/24 hrs  No airleak seen  Wounds c/d/i  Abdominal:      Palpations: Abdomen is soft.   Skin:     General: Skin is warm and dry.   Neurological:      Mental Status: She is alert and oriented to person, place, and time.   Psychiatric:         Mood and Affect: Mood normal.       Labs:  No results found for this or any previous visit (from the past 24 hours).  Medical Decision Making, by  Problem:  Active Hospital Problems    Diagnosis     Lung nodules [R91.8]      Plan:  Chest tube removed, Tegaderm bandage placed.  Pt counseled.  Will keep pt in hospital another night for pain control.      Quality Measures:  Quality-Core Measures   Reviewed items::  Medications reviewed  Jenkins catheter::  No Jenkins  DVT prophylaxis pharmacological::  Enoxaparin (Lovenox)  DVT prophylaxis - mechanical:  SCDs  Ulcer Prophylaxis::  Yes      Discussed patient condition with Family, RN, Patient, and Dr. Ganser

## 2025-06-11 NOTE — CARE PLAN
The patient is Stable - Low risk of patient condition declining or worsening    Shift Goals  Clinical Goals: pain management, CT management, Ambulation, pulm hygiene  Patient Goals: Pain control, sleep  Family Goals: updates    Progress made toward(s) clinical / shift goals:  Patient able to manage pain with medications in the MAR and non-pharm interventions. Patient able to verbalize understanding of need to use IS, ambulate and do self care.     Patient is not progressing towards the following goals:

## 2025-06-11 NOTE — DISCHARGE PLANNING
Case Management Discharge Planning    Admission Date: 6/9/2025  GMLOS: 2.4  ALOS: 2    6-Clicks ADL Score: 23  6-Clicks Mobility Score: 21      Anticipated Discharge Dispo:      DME Needed: No    Action(s) Taken: LMMORRIS went to bedside to talk to pt about home O2. Pt reported that she is on service with [facility] and has a tank at bedside. Pt reported that her current O2 order is for 2L however, pt reported she has been using 4L for the past week. LMSW informed pt that we can do a new walking O2 test and get a new referral and order sent to [facility].  LMSW informed bedside RN and provider.     Escalations Completed: None    Medically Clear: No    Next Steps: LMSW will continue to assist with DC needs/barriers.    Barriers to Discharge: Medical clearance    Is the patient up for discharge tomorrow: No

## 2025-06-11 NOTE — CARE PLAN
The patient is Stable - Low risk of patient condition declining or worsening    Shift Goals  Clinical Goals: monitor chest tube, pain control, pulmonary hygiene, ambulate    Progress made toward(s) clinical / shift goals:  Chest tube in place with serosanguinous drainage and no sign of leak. Dressing changed this shift. Medicating frequently for pain prn per MAR. Pt using IS and able to pull 2000. Pt ambulated in room and to restroom.  Pt is a moderate fall risk with all interventions in place including door sign, frequent rounding, room close to nursing station, education, family at bedside, call light in place and calls appropriately.  Refused bed alarm d/t frequent changing of positions in bed setting off the alarm.      Patient is not progressing towards the following goals:

## 2025-06-11 NOTE — PROGRESS NOTES
Received report from previous shift RN at 0715.  Assessment complete.  A&O x 4. Patient calls appropriately.  Patient ambulates with standby assist. Bed alarm refused.   Patient has 5/10 pain. Pain managed with prescribed medications.  Denies N&V. Tolerating regular diet.  + void, - BM.  Patient denies SOB. Spo2>95% 3L NC..  SCD's refused.  Reviewed plan of care with patient. Call light and personal belongings with in reach. Hourly rounding in place. All needs met at this time.

## 2025-06-11 NOTE — CARE PLAN
Problem: Pain - Standard  Goal: Alleviation of pain or a reduction in pain to the patient’s comfort goal  Outcome: Progressing     Problem: Knowledge Deficit - Standard  Goal: Patient and family/care givers will demonstrate understanding of plan of care, disease process/condition, diagnostic tests and medications  Outcome: Progressing     Problem: Fall Risk  Goal: Patient will remain free from falls  Outcome: Progressing    Shift Goals  Clinical Goals: Pain management, monitor chest tube, ambulation, pulmonary hygiene  Patient Goals: Pain management, ambulation, rest  Family Goals: updates

## 2025-06-11 NOTE — RESPIRATORY CARE
"COPD EDUCATION by COPD CLINICAL EDUCATOR  6/11/2025  at  8:31 AM by Louann Rush, RRT     Patient interviewed by education team.  Patient declined or is unable to participate in the full program.  Therefore, a short intervention has been conducted.  Patient is established with [facility], no medications at home.      COPD Screen  COPD Risk Screening  Do you have a history of COPD?: Yes  Do you have a Pulmonologist?: Yes    COPD Assessment  COPD Clinical Specialists ONLY  COPD Education Initiated: Yes--Short Intervention   Is this a COPD exacerbation patient?: No  DME Company: [employer]  DME Equipment Type: O2 PRN  Pulmonary Follow Up Appointment: 08/04/25 (sleep study)  Appt Time: 2100  Pulmonologist Name: [facility]  Pulmonary Rehab: No  Smoking Cessation: N/A  Hospice: No  Home Health Care: No  Mobile Urgent Care Services: No  Geriatric Specialty Group: No  Private In-Home Care Agency: No  (OP) Pulmonary Function Testing: Yes (2025 FVC 1.82 FEV1 1.46 Ratio 81 DLCO 73)  Interdisciplinary Rounds: Attendance at Rounds (30 Min)    PFT Results    No results found for: \"PFT\"    Meds to Beds  [facility] provides bedside medication delivery for all eligible patients at discharge and you have been automatically enrolled in the Meds to Beds Program. Would you like to opt out of this program for any reason?: No - Stay Opted In     MY COPD ACTION PLAN     It is recommended that patients and physicians/healthcare providers complete this action plan together. This plan should be discussed at each physician visit and updated as needed.    The green, yellow and red zones show groups of symptoms of COPD. This list of symptoms is not comprehensive, and you may experience other symptoms. In the \"Actions\" column, your healthcare provider has recommended actions for you to take based on your symptoms.    Patient Name: Hanna Melo   YOB: 1970   Last Updated on:     Green Zone:  I am doing well today Actions     " "Usual activitiy and exercise level   Take daily medications     Usual amounts of cough and phlegm/mucus   Use oxygen as prescribed     Sleep well at night   Continue regular exercise/diet plan     Appetite is good   At all times avoid cigarette smoke, inhaled irritants     Daily Medications (these medications are taken every day):                Yellow Zone:  I am having a bad day or a COPD flare Actions     More breathless than usual   Continue daily medications     I have less energy for my daily activities   Use quick relief inhaler as ordered     Increased or thicker phlegm/mucus   Use oxygen as prescribed     Using quick relief inhaler/nebulizer more often   Get plenty of rest     Swelling of ankles more than usual   Use pursed lip breathing     More coughing than usual   At all times avoid cigarette smoke, inhaled irritants     I feel like I have a \"chest cold\"     Poor sleep and my symptoms woke me up     My appetite is not good     My medicine is not helping      Call provider immediately if symptoms don’t improve     Continue daily medications, add rescue medications:               Medications to be used during a flare up, (as Discussed with Provider):              Red Zone:  I need urgent medical care Actions     Severe shortness of breath even at rest   Call 911 or seek medical care immediately     Not able to do any activity because of breathing      Fever or shaking chills      Feeling confused or very drowsy       Chest pains      Coughing up blood                  "

## 2025-06-11 NOTE — PROGRESS NOTES
Patient is refusing a bed alarm. Patient educated on the use of the bed alarm and safety practices. Eufemia Dove RN notified.

## 2025-06-11 NOTE — PROGRESS NOTES
Pt stated she was reaching for her leg and felt a pop in her ribs that has caused an increase in her pain. PRN Medications given with only little relief.  Updated MD.

## 2025-06-12 ENCOUNTER — PHARMACY VISIT (OUTPATIENT)
Dept: PHARMACY | Facility: MEDICAL CENTER | Age: 55
End: 2025-06-12
Payer: COMMERCIAL

## 2025-06-12 VITALS
SYSTOLIC BLOOD PRESSURE: 107 MMHG | HEART RATE: 74 BPM | TEMPERATURE: 97.3 F | WEIGHT: 251.32 LBS | BODY MASS INDEX: 41.87 KG/M2 | HEIGHT: 65 IN | DIASTOLIC BLOOD PRESSURE: 69 MMHG | OXYGEN SATURATION: 93 % | RESPIRATION RATE: 18 BRPM

## 2025-06-12 PROCEDURE — RXMED WILLOW AMBULATORY MEDICATION CHARGE: Performed by: SURGERY

## 2025-06-12 PROCEDURE — A9270 NON-COVERED ITEM OR SERVICE: HCPCS | Performed by: PHYSICIAN ASSISTANT

## 2025-06-12 PROCEDURE — 700111 HCHG RX REV CODE 636 W/ 250 OVERRIDE (IP): Mod: JZ | Performed by: PHYSICIAN ASSISTANT

## 2025-06-12 PROCEDURE — 700102 HCHG RX REV CODE 250 W/ 637 OVERRIDE(OP): Performed by: PHYSICIAN ASSISTANT

## 2025-06-12 PROCEDURE — 94669 MECHANICAL CHEST WALL OSCILL: CPT

## 2025-06-12 RX ORDER — OXYCODONE HYDROCHLORIDE 5 MG/1
5 TABLET ORAL EVERY 4 HOURS PRN
Qty: 30 TABLET | Refills: 0 | Status: SHIPPED | OUTPATIENT
Start: 2025-06-12 | End: 2025-06-17

## 2025-06-12 RX ADMIN — ENOXAPARIN SODIUM 40 MG: 100 INJECTION SUBCUTANEOUS at 04:14

## 2025-06-12 RX ADMIN — EZETIMIBE 10 MG: 10 TABLET ORAL at 08:10

## 2025-06-12 RX ADMIN — FAMOTIDINE 20 MG: 20 TABLET, FILM COATED ORAL at 04:14

## 2025-06-12 RX ADMIN — KETOROLAC TROMETHAMINE 15 MG: 15 INJECTION, SOLUTION INTRAMUSCULAR; INTRAVENOUS at 04:14

## 2025-06-12 RX ADMIN — OXYCODONE HYDROCHLORIDE 10 MG: 10 TABLET ORAL at 08:01

## 2025-06-12 RX ADMIN — LEVOTHYROXINE SODIUM 75 MCG: 0.05 TABLET ORAL at 04:14

## 2025-06-12 RX ADMIN — OXYCODONE HYDROCHLORIDE 10 MG: 10 TABLET ORAL at 12:41

## 2025-06-12 ASSESSMENT — PAIN DESCRIPTION - PAIN TYPE
TYPE: ACUTE PAIN
TYPE: SURGICAL PAIN
TYPE: ACUTE PAIN
TYPE: SURGICAL PAIN
TYPE: ACUTE PAIN
TYPE: SURGICAL PAIN
TYPE: ACUTE PAIN

## 2025-06-12 NOTE — PROGRESS NOTES
Patient continues to refuse bed alarm, education has been provided on the benefits verses the risks. Patient continues to refuse. Charge notified.   Detail Level: Detailed Detail Level: Zone

## 2025-06-12 NOTE — DISCHARGE SUMMARY
Discharge Summary    CHIEF COMPLAINT ON ADMISSION  Bronchial tumor    Reason for Admission  Excision bronchial tumor    Admission Date  6/9/2025    CODE STATUS  Full Code    HPI & HOSPITAL COURSE  This is a 54 y.o. female here with a bronchial tumor. She had a thoracotomy with removal of the bronchial mass by sleeve resection without removing any lung. No complications. O2 sats baseline. Pain controlled. Walking independently and using IS. 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      Therefore, she is discharged in good and stable condition to home with close outpatient follow-up.    The patient met 2-midnight criteria for an inpatient stay at the time of discharge.    Discharge Date  6/12/2025      DISCHARGE DIAGNOSES  Active Problems:    Lung nodules (POA: Yes)  Resolved Problems:    * No resolved hospital problems. *      FOLLOW UP  Future Appointments   Date Time Provider Department Center   8/4/2025  9:00 PM SLEEP TECH [facility] None   9/22/2025 12:40 PM Caio Winn M.D. [facility] None     No follow-up provider specified.    MEDICATIONS ON DISCHARGE     Medication List        START taking these medications        Instructions   oxyCODONE immediate-release 5 MG Tabs  Commonly known as: Roxicodone   Take 1 Tablet by mouth every four hours as needed for Severe Pain for up to 5 days.  Dose: 5 mg            CONTINUE taking these medications        Instructions   acetaminophen 500 MG Tabs  Commonly known as: Tylenol   Take 1,000 mg by mouth every 6 hours as needed for Moderate Pain.  Dose: 1,000 mg     diphenhydrAMINE 25 MG Tabs  Commonly known as: Benadryl   Take 25 mg by mouth at bedtime as needed for Sleep.  Dose: 25 mg     ezetimibe 10 MG Tabs  Commonly known as: Zetia   Take 10 mg by mouth every day.  Dose: 10 mg     hydroCHLOROthiazide 25 MG Tabs   Take 25 mg by mouth every day.  Dose: 25 mg     levothyroxine 75 MCG Tabs  Commonly known as: Synthroid   Take 75 mcg by mouth every morning on an empty  stomach.  Dose: 75 mcg     oxymetazoline 0.05 % Soln  Commonly known as: Afrin   Administer 2 Sprays into affected nostril(S) 2 times a day as needed for Congestion.  Dose: 2 Spray              Allergies  Allergies[1]    DIET  Orders Placed This Encounter   Procedures    Diet Order Diet: Regular     Standing Status:   Standing     Number of Occurrences:   1     ERAS:   Yes     Diet::   Regular [1]       ACTIVITY  Light duty.      PROCEDURES  Removal bronchial tumor    LABORATORY  Lab Results   Component Value Date    SODIUM 134 (L) 06/10/2025    POTASSIUM 4.4 06/10/2025    CHLORIDE 98 06/10/2025    CO2 25 06/10/2025    GLUCOSE 137 (H) 06/10/2025    BUN 12 06/10/2025    CREATININE 0.69 06/10/2025        Lab Results   Component Value Date    WBC 11.4 (H) 06/10/2025    HEMOGLOBIN 13.2 06/10/2025    HEMATOCRIT 41.2 06/10/2025    PLATELETCT 140 (L) 06/10/2025               [1]   Allergies  Allergen Reactions    Tetrahydrocannabinol (Thc Non-Synthetic) [Tetrahydrocannabinol] Anaphylaxis    Codeine Hives and Vomiting    Strawberry

## 2025-06-12 NOTE — CARE PLAN
The patient is Stable - Low risk of patient condition declining or worsening    Shift Goals  Clinical Goals: Pain management, pulm hygiene, ambulation  Patient Goals: Pain management, sleep  Family Goals: updates    Progress made toward(s) clinical / shift goals:  Patient able to manage pain with medication in the MAR and non-pharm interventions. Patient is able to verbalize understanding of need to use IS and perform deep beathing for pulm hygiene.     Patient is not progressing towards the following goals:

## 2025-06-12 NOTE — PROGRESS NOTES
"Assumed care of patient at 1845. Bedside report received. Assessment complete.  AA&Ox4. Denies CP/SOB.  Reporting 4/10 pain. Medicated per MAR. Educated patient regarding pharmacologic and non pharmacologic modalities for pain management.  Skin per flowsheet.  Tolerating Regular diet. Denies N/V.  + void. - BM. Last BM 6/8  Pt ambulates SBA    All needs met at this time. Call light within reach. Pt calls appropriately. Bed low and locked, non skid socks in place. Hourly rounding in place.    /73   Pulse 91   Temp 36.2 °C (97.2 °F) (Temporal)   Resp 18   Ht 1.651 m (5' 5\")   Wt 114 kg (251 lb 5.2 oz)   SpO2 96%   BMI 41.82 kg/m²     "

## 2025-06-12 NOTE — DISCHARGE PLANNING
Case Management Discharge Planning    Admission Date: 6/9/2025  GMLOS: 4.1  ALOS: 3    6-Clicks ADL Score: 23  6-Clicks Mobility Score: 21      Anticipated Discharge Dispo:      DME Needed: No    Action(s) Taken: LMSW informed by bedside RN that pt is on 2L of O2 which per pt is her baseline O2 from [facility]. Walking O2 completed showing pt is needing 2L O2.   Per pt, pt has tank at bedside for transport home.    Escalations Completed: None    Medically Clear: No    Next Steps: LMSW will continue to assist with DC needs/barriers.     Barriers to Discharge: None    Is the patient up for discharge tomorrow: No

## 2025-06-12 NOTE — PROGRESS NOTES
Patient discharging to [facility] with daughter accompanying. Patient states she has all belongings including home O2 tank. All needs met at this time.

## 2025-06-12 NOTE — PROGRESS NOTES
Bedside report received.  Assessment complete.  A&O x 4. Patient calls appropriately.  Patient ambulates with standby assist. Bed alarm refused.   Patient has 6/10 pain. Patient medicated per MAR.  Denies N&V. Tolerating regular diet.  Surgical L chest thora site DIP, old chest tube site DIP.  + void, + flatus, - BM.  Patient denies SOB.  SCD's refused.  Review plan with of care with patient. Call light and personal belongings within reach. Hourly rounding in place. All needs met at this time.

## 2025-06-12 NOTE — PROGRESS NOTES
Patient continues to refuse bed and frame alarm, Patient is a SBA with daughter in room and frequently moves from side of bed, to bed, to chair. Education has been provided on fall risk and injury. Patient able to verbalize understanding and continues to refuse. Charge nurse notified.

## 2025-06-16 ENCOUNTER — HOSPITAL ENCOUNTER (EMERGENCY)
Facility: MEDICAL CENTER | Age: 55
End: 2025-06-16
Attending: EMERGENCY MEDICINE
Payer: COMMERCIAL

## 2025-06-16 ENCOUNTER — APPOINTMENT (OUTPATIENT)
Dept: RADIOLOGY | Facility: MEDICAL CENTER | Age: 55
End: 2025-06-16
Attending: EMERGENCY MEDICINE
Payer: COMMERCIAL

## 2025-06-16 VITALS
DIASTOLIC BLOOD PRESSURE: 94 MMHG | WEIGHT: 272.49 LBS | BODY MASS INDEX: 45.34 KG/M2 | SYSTOLIC BLOOD PRESSURE: 150 MMHG | OXYGEN SATURATION: 97 % | TEMPERATURE: 96.4 F | RESPIRATION RATE: 24 BRPM | HEART RATE: 85 BPM

## 2025-06-16 DIAGNOSIS — R07.9 CHEST PAIN, UNSPECIFIED TYPE: Primary | ICD-10-CM

## 2025-06-16 LAB
ALBUMIN SERPL BCP-MCNC: 3.5 G/DL (ref 3.2–4.9)
ALBUMIN/GLOB SERPL: 1.1 G/DL
ALP SERPL-CCNC: 47 U/L (ref 30–99)
ALT SERPL-CCNC: 20 U/L (ref 2–50)
ANION GAP SERPL CALC-SCNC: 13 MMOL/L (ref 7–16)
AST SERPL-CCNC: 38 U/L (ref 12–45)
BASOPHILS # BLD AUTO: 0.7 % (ref 0–1.8)
BASOPHILS # BLD: 0.06 K/UL (ref 0–0.12)
BILIRUB SERPL-MCNC: <0.2 MG/DL (ref 0.1–1.5)
BUN SERPL-MCNC: 9 MG/DL (ref 8–22)
CALCIUM ALBUM COR SERPL-MCNC: 9.6 MG/DL (ref 8.5–10.5)
CALCIUM SERPL-MCNC: 9.2 MG/DL (ref 8.5–10.5)
CHLORIDE SERPL-SCNC: 99 MMOL/L (ref 96–112)
CO2 SERPL-SCNC: 26 MMOL/L (ref 20–33)
CREAT SERPL-MCNC: 0.68 MG/DL (ref 0.5–1.4)
EKG IMPRESSION: NORMAL
EOSINOPHIL # BLD AUTO: 0.24 K/UL (ref 0–0.51)
EOSINOPHIL NFR BLD: 2.7 % (ref 0–6.9)
ERYTHROCYTE [DISTWIDTH] IN BLOOD BY AUTOMATED COUNT: 45.1 FL (ref 35.9–50)
FINAL REPORT Q0603: NORMAL
GFR SERPLBLD CREATININE-BSD FMLA CKD-EPI: 102 ML/MIN/1.73 M 2
GLOBULIN SER CALC-MCNC: 3.2 G/DL (ref 1.9–3.5)
GLUCOSE SERPL-MCNC: 111 MG/DL (ref 65–99)
HCT VFR BLD AUTO: 40.9 % (ref 37–47)
HGB BLD-MCNC: 13.3 G/DL (ref 12–16)
IMM GRANULOCYTES # BLD AUTO: 0.08 K/UL (ref 0–0.11)
IMM GRANULOCYTES NFR BLD AUTO: 0.9 % (ref 0–0.9)
LIPASE SERPL-CCNC: 27 U/L (ref 11–82)
LYMPHOCYTES # BLD AUTO: 1.86 K/UL (ref 1–4.8)
LYMPHOCYTES NFR BLD: 21 % (ref 22–41)
MCH RBC QN AUTO: 29.8 PG (ref 27–33)
MCHC RBC AUTO-ENTMCNC: 32.5 G/DL (ref 32.2–35.5)
MCV RBC AUTO: 91.7 FL (ref 81.4–97.8)
MONOCYTES # BLD AUTO: 0.85 K/UL (ref 0–0.85)
MONOCYTES NFR BLD AUTO: 9.6 % (ref 0–13.4)
NEUTROPHILS # BLD AUTO: 5.78 K/UL (ref 1.82–7.42)
NEUTROPHILS NFR BLD: 65.1 % (ref 44–72)
NRBC # BLD AUTO: 0 K/UL
NRBC BLD-RTO: 0 /100 WBC (ref 0–0.2)
NT-PROBNP SERPL IA-MCNC: 278 PG/ML (ref 0–125)
PLATELET # BLD AUTO: 349 K/UL (ref 164–446)
PMV BLD AUTO: 9.9 FL (ref 9–12.9)
POTASSIUM SERPL-SCNC: 4 MMOL/L (ref 3.6–5.5)
PRELIMINARY RPT Q0601: NORMAL
PROT SERPL-MCNC: 6.7 G/DL (ref 6–8.2)
RBC # BLD AUTO: 4.46 M/UL (ref 4.2–5.4)
RHODAMINE-AURAMINE STN SPEC: NORMAL
SODIUM SERPL-SCNC: 138 MMOL/L (ref 135–145)
TROPONIN T SERPL-MCNC: <6 NG/L (ref 6–19)
WBC # BLD AUTO: 8.9 K/UL (ref 4.8–10.8)

## 2025-06-16 PROCEDURE — 96375 TX/PRO/DX INJ NEW DRUG ADDON: CPT

## 2025-06-16 PROCEDURE — 96376 TX/PRO/DX INJ SAME DRUG ADON: CPT

## 2025-06-16 PROCEDURE — 80053 COMPREHEN METABOLIC PANEL: CPT

## 2025-06-16 PROCEDURE — 85025 COMPLETE CBC W/AUTO DIFF WBC: CPT

## 2025-06-16 PROCEDURE — 83690 ASSAY OF LIPASE: CPT

## 2025-06-16 PROCEDURE — 36415 COLL VENOUS BLD VENIPUNCTURE: CPT

## 2025-06-16 PROCEDURE — 93005 ELECTROCARDIOGRAM TRACING: CPT | Mod: TC | Performed by: EMERGENCY MEDICINE

## 2025-06-16 PROCEDURE — 83880 ASSAY OF NATRIURETIC PEPTIDE: CPT

## 2025-06-16 PROCEDURE — 99284 EMERGENCY DEPT VISIT MOD MDM: CPT

## 2025-06-16 PROCEDURE — 96374 THER/PROPH/DIAG INJ IV PUSH: CPT

## 2025-06-16 PROCEDURE — 84484 ASSAY OF TROPONIN QUANT: CPT

## 2025-06-16 PROCEDURE — 71045 X-RAY EXAM CHEST 1 VIEW: CPT

## 2025-06-16 PROCEDURE — 700111 HCHG RX REV CODE 636 W/ 250 OVERRIDE (IP): Mod: JZ | Performed by: EMERGENCY MEDICINE

## 2025-06-16 RX ORDER — HYDROMORPHONE HYDROCHLORIDE 1 MG/ML
0.5 INJECTION, SOLUTION INTRAMUSCULAR; INTRAVENOUS; SUBCUTANEOUS ONCE
Status: COMPLETED | OUTPATIENT
Start: 2025-06-16 | End: 2025-06-16

## 2025-06-16 RX ORDER — ONDANSETRON 2 MG/ML
4 INJECTION INTRAMUSCULAR; INTRAVENOUS ONCE
Status: COMPLETED | OUTPATIENT
Start: 2025-06-16 | End: 2025-06-16

## 2025-06-16 RX ADMIN — HYDROMORPHONE HYDROCHLORIDE 0.5 MG: 1 INJECTION, SOLUTION INTRAMUSCULAR; INTRAVENOUS; SUBCUTANEOUS at 13:03

## 2025-06-16 RX ADMIN — HYDROMORPHONE HYDROCHLORIDE 0.5 MG: 1 INJECTION, SOLUTION INTRAMUSCULAR; INTRAVENOUS; SUBCUTANEOUS at 14:37

## 2025-06-16 RX ADMIN — ONDANSETRON 4 MG: 2 INJECTION INTRAMUSCULAR; INTRAVENOUS at 13:03

## 2025-06-16 ASSESSMENT — PAIN DESCRIPTION - PAIN TYPE
TYPE: ACUTE PAIN
TYPE: ACUTE PAIN

## 2025-06-16 ASSESSMENT — HEART SCORE
HISTORY: SLIGHTLY SUSPICIOUS
AGE: 45-64
TROPONIN: LESS THAN OR EQUAL TO NORMAL LIMIT
RISK FACTORS: >2 RISK FACTORS OR HX OF ATHEROSCLEROTIC DISEASE
HEART SCORE: 3
ECG: NORMAL

## 2025-06-16 ASSESSMENT — LIFESTYLE VARIABLES: DO YOU DRINK ALCOHOL: NO

## 2025-06-16 NOTE — ED TRIAGE NOTES
Chief Complaint   Patient presents with    Chest Wall Pain     Pt reports post op pain after tumor resection from L chest     Pt contacted surgeon and instructed to come to the ER.

## 2025-06-16 NOTE — DISCHARGE PLANNING
TCN following. HTH/SCP chart review completed. Per chart review, note pt currently in ED 2' to chest wall pain (post op pain after tumor resection from L chest).  Patient underwent a bronchoscopy, Left thoracotomy with sleeve resection of bronchial tumor and node sampling on 6/9/25.      Per chart review, patient has a Non-[facility] PCP.  Per review,she is ambulatory at her baseline.  She is on 2 L/min O2 at baseline with [facility] and is currently o 3 L/min O2.  At this time anticipating that pt will dc to home with close OP f/u (either directly from ED or after admission to [facility] if warranted).     If patient does not warrant admission/ inpatient status to [facility] and is unable to functionally discharge home, please reach out to TCN for assist with SCP auth to discharge directly from ED to [facility].      If patient admits to [facility], please reach out to TCN via VOALTE if post acute transitional care needs are warranted for dc planning.

## 2025-06-16 NOTE — ED PROVIDER NOTES
ED Provider Note    CHIEF COMPLAINT  Chief Complaint   Patient presents with    Chest Wall Pain     Pt reports post op pain after tumor resection from L chest       EXTERNAL RECORDS REVIEWED  Inpatient Notes Discharge summary from 6/12/2025 when the patient had been hospitalized after having a thoracotomy and bronchial mass resection by Dr. Ganser HPI/MORENA  LIMITATION TO HISTORY   Select: : None  OUTSIDE HISTORIAN(S):  None    Hanna Melo is a 54 y.o. female who presents to the emergency department for evaluation of chest pain.  The patient states that she had a bronchial mass removed 7 days ago.  She states that 2 nights ago she went to lay down to go to sleep on her left side on accident.  The left side is the side of surgery.  She states that she had worsening pain immediately after this.  Her  helped her up.  She states that since then she has been having intermittent severe pain near the incision site.  It radiates to the front part of her chest.  She has had a mild cough but denies any hemoptysis.  She denies shortness of breath.  She states that her legs were more swollen today and she took Lasix.  She states that they have improved.  She does have a history of hypertension and hyperlipidemia.  She states that she recently had a normal echo.  She denies any known fevers.  She is currently on oxygen 2 to 4 L as needed at home secondary to emphysema.    PAST MEDICAL HISTORY   has a past medical history of Anesthesia, Bronchitis, Delayed emergence from general anesthesia, Dental disorder, Disorder of thyroid, Emphysema of lung (HCC), Hemangioma of liver, Hemorrhagic disorder (HCC), High cholesterol, Hypertension, Pancreatic divisum, Shortness of breath, Sleep apnea, Snoring, and Wheezing.    SURGICAL HISTORY   has a past surgical history that includes primary c section (1987); tubal ligation (1997); hysterectomy, total abdominal (2002); reconstruction, knee, acl (Left, 2007); laparoscopy (2006);  bronchoscopy,diagnostic (N/A, 2025); bronchoscopy,diagnostic (2025); and thoracotomy (2025).    FAMILY HISTORY  Family History   Problem Relation Age of Onset    Hypertension Mother         Controlled with medication    Thyroid Mother     Alcohol abuse Father     Lung Disease Father             Breast Cancer Sister     No Known Problems Brother     Diabetes Maternal Grandmother             Diabetes Paternal Grandmother             Breast Cancer Paternal Grandmother             Breast Cancer Paternal Aunt        SOCIAL HISTORY  Social History     Tobacco Use    Smoking status: Former     Current packs/day: 1.00     Average packs/day: 1 pack/day for 35.9 years (35.9 ttl pk-yrs)     Types: Cigarettes     Start date: 1989    Smokeless tobacco: Never    Tobacco comments:     Still use nicorette lozenges 4 mg. Last one 25   Vaping Use    Vaping status: Former   Substance and Sexual Activity    Alcohol use: Not Currently     Alcohol/week: 3.0 oz     Types: 5 Shots of liquor per week     Comment: Not currently my daughter wont let me    Drug use: Not Currently     Types: Inhaled     Comment: 20+ years drug free    Sexual activity: Yes     Partners: Male     Comment:        CURRENT MEDICATIONS  Home Medications    **Home medications have not yet been reviewed for this encounter**       Audit from Redirected Encounters    **Home medications have not yet been reviewed for this encounter**         ALLERGIES  Allergies[1]    PHYSICAL EXAM  VITAL SIGNS: BP (!) 146/89   Pulse 81   Temp 36.1 °C (97 °F) (Temporal)   Resp 16   Wt 124 kg (272 lb 7.8 oz)   SpO2 96%   BMI 45.34 kg/m²   Constitutional: Alert and uncomfortable appearing.  HENT: Normocephalic atraumatic. Bilateral external ears normal. Nose normal. Mucous membranes are moist.  Eyes: Pupils are equal and reactive. Conjunctiva normal. Non-icteric sclera.   Neck: Normal range of motion without tenderness.  Supple. No meningeal signs.  Cardiovascular: Regular rate and rhythm. No murmurs, gallops or rubs.  Thorax & Lungs: The patient is tachypneic.  She appears to be splinting.  Diminished breath sounds are noted bilaterally.  A healing surgical incision is noted over the posterior left chest.   Abdomen: Soft, nontender and nondistended.   Skin: Warm and dry. No rashes are noted.  Extremities: 2+ peripheral pulses. Cap refill is less than 2 seconds. No edema, cyanosis, or clubbing.  Musculoskeletal: Good range of motion in all major joints. No tenderness to palpation or major deformities noted.   Neurologic: Alert and oriented x 3. The patient moves all 4 extremities without obvious deficits.    EKG/LABS  Results for orders placed or performed during the hospital encounter of 06/16/25   CBC WITH DIFFERENTIAL    Collection Time: 06/16/25 12:26 PM   Result Value Ref Range    WBC 8.9 4.8 - 10.8 K/uL    RBC 4.46 4.20 - 5.40 M/uL    Hemoglobin 13.3 12.0 - 16.0 g/dL    Hematocrit 40.9 37.0 - 47.0 %    MCV 91.7 81.4 - 97.8 fL    MCH 29.8 27.0 - 33.0 pg    MCHC 32.5 32.2 - 35.5 g/dL    RDW 45.1 35.9 - 50.0 fL    Platelet Count 349 164 - 446 K/uL    MPV 9.9 9.0 - 12.9 fL    Neutrophils-Polys 65.10 44.00 - 72.00 %    Lymphocytes 21.00 (L) 22.00 - 41.00 %    Monocytes 9.60 0.00 - 13.40 %    Eosinophils 2.70 0.00 - 6.90 %    Basophils 0.70 0.00 - 1.80 %    Immature Granulocytes 0.90 0.00 - 0.90 %    Nucleated RBC 0.00 0.00 - 0.20 /100 WBC    Neutrophils (Absolute) 5.78 1.82 - 7.42 K/uL    Lymphs (Absolute) 1.86 1.00 - 4.80 K/uL    Monos (Absolute) 0.85 0.00 - 0.85 K/uL    Eos (Absolute) 0.24 0.00 - 0.51 K/uL    Baso (Absolute) 0.06 0.00 - 0.12 K/uL    Immature Granulocytes (abs) 0.08 0.00 - 0.11 K/uL    NRBC (Absolute) 0.00 K/uL   COMP METABOLIC PANEL    Collection Time: 06/16/25 12:26 PM   Result Value Ref Range    Sodium 138 135 - 145 mmol/L    Potassium 4.0 3.6 - 5.5 mmol/L    Chloride 99 96 - 112 mmol/L    Co2 26 20 - 33 mmol/L     Anion Gap 13.0 7.0 - 16.0    Glucose 111 (H) 65 - 99 mg/dL    Bun 9 8 - 22 mg/dL    Creatinine 0.68 0.50 - 1.40 mg/dL    Calcium 9.2 8.5 - 10.5 mg/dL    Correct Calcium 9.6 8.5 - 10.5 mg/dL    AST(SGOT) 38 12 - 45 U/L    ALT(SGPT) 20 2 - 50 U/L    Alkaline Phosphatase 47 30 - 99 U/L    Total Bilirubin <0.2 0.1 - 1.5 mg/dL    Albumin 3.5 3.2 - 4.9 g/dL    Total Protein 6.7 6.0 - 8.2 g/dL    Globulin 3.2 1.9 - 3.5 g/dL    A-G Ratio 1.1 g/dL   TROPONIN    Collection Time: 25 12:26 PM   Result Value Ref Range    Troponin T <6 6 - 19 ng/L   LIPASE    Collection Time: 25 12:26 PM   Result Value Ref Range    Lipase 27 11 - 82 U/L   proBrain Natriuretic Peptide, NT    Collection Time: 25 12:26 PM   Result Value Ref Range    NT-proBNP 278 (H) 0 - 125 pg/mL   ESTIMATED GFR    Collection Time: 25 12:26 PM   Result Value Ref Range    GFR (CKD-EPI) 102 >60 mL/min/1.73 m 2   EKG (NOW)    Collection Time: 25  2:29 PM   Result Value Ref Range    Report       [facility] Emergency Dept.    Test Date:  2025  Pt Name:    SIMEON ZAVALA                   Department: ER  MRN:        [id number]                      Room:       [facility]  Gender:     Female                       Technician: [id number]  :        [id number]                   Requested By:CHARITY WHITTEN  Order #:    [id number]                    Reading MD: Charity Whitten    Measurements  Intervals                                Axis  Rate:       79                           P:          38  OK:         149                          QRS:        36  QRSD:       103                          T:          25  QT:         406  QTc:        466    Interpretive Statements  Sinus rhythm  Low voltage, precordial leads  Baseline wander in lead(s) III,V6  Compared to ECG 2025 09:44:07  Low QRS voltage now present    Electronically Signed On 2025 14:29:24 PDT by Charity Whitten       I have independently interpreted this EKG    RADIOLOGY/PROCEDURES    I have independently interpreted the diagnostic imaging associated with this visit and am waiting the final reading from the radiologist.   My preliminary interpretation is as follows: Lung fields are clear bilaterally.    Radiologist interpretation:  DX-CHEST-PORTABLE (1 VIEW)   Final Result      1.  Mild cardiomegaly.   2.  Small left pleural effusion.   3.  Bibasilar atelectasis.          COURSE & MEDICAL DECISION MAKING    ASSESSMENT, COURSE AND PLAN  Care Narrative: This is a 54-year-old female presenting to the emergency department for evaluation of chest pain.  On initial evaluation, the patient appeared uncomfortable.  She was tachypneic and appeared to be splinting secondary to pain.  A healing surgical incision was noted on the left posterior chest wall.  She had diminished breath sounds bilaterally but did have bilateral breath sounds.    A chest x-ray was performed and revealed mild cardiomegaly but both lungs appear to be fully inflated.  A small left pleural effusion consistent with her recent surgery was noted.    EKG did not show any evidence of acute ischemia.  Her troponin was normal.  Her heart score is 3 and I am less concerned for ACS at this time.    Electrolytes were without derangement.  H&H and white count were also within normal limits.  LFTs and lipase were also normal.    2:03 PM - I discussed the case with Dr Ganser, interventional radiology.  He reviewed the chest xray and noted that the lungs appear to be well expanded. He suspects that the patient's pain is secondary to musculoskeletal pain from the incision.  He is less concerned for pulmonary embolism at this time.    I reevaluated the patient after Dilaudid.  She appeared much improved.  Again, given the inciting factor of her laying on the left side 2 days ago followed by worsening pain that has not been amenable to her home oral meds, I suspect this is more likely musculoskeletal and associated with the recent trauma secondary  to the surgery.  She had no evidence of hypoxia and her tachypnea resolved after she was given pain medication.  I am less concerned for PE at this point 2.      Dr. Ganser will see her tomorrow in the office in follow-up.  I discussed strict return precautions.  She does have pain medicine at home that she will use as needed.    The patient has atypical chest pain as the patient's chest pain is not suggestive of pulmonary embolus, cardiac ischemia, aortic dissection, or other serious etiology. Given the extremely low risk of these diagnoses further testing and evaluation for these possibilities does not appear to be indicated at this time. The patient has been instructed to return if the symptoms worsen or change in any way.    CHEST PAIN:   HEART Score for Major Cardiac Events  HEART Score     History: Slightly suspicious  ECG: Normal  Age: 45-64  Risk Factors: >2 risk factors or hx of atherosclerotic disease  Troponin: Less than or equal to normal limit    Heart Score: 3    Total Score   0-3 Points = Low Score, risk of MACE 0.9-1.7%.  4-6 Points = Moderate Score, risk of MACE 12-16.6%  7-10 Points = High Score, risk of MACE 50-65%    ADDITIONAL PROBLEMS MANAGED  None    DISPOSITION AND DISCUSSIONS  I have discussed management of the patient with the following physicians and BRUCE's:  Dr Ganser, interventional radiology    Discussion of management with other [facility] or appropriate source(s): None     Escalation of care considered, and ultimately not performed:acute inpatient care management, however at this time, the patient is most appropriate for outpatient management    Barriers to care at this time, including but not limited to: None.     Decision tools and prescription drugs considered including, but not limited to: None.    FINAL IMPRESSION  1. Chest pain, unspecified type      PRESCRIPTIONS  New Prescriptions    No medications on file     FOLLOW UP  John H Ganser, M.D.  [address]  [facility] [address]  [facility]  [id number] [phone]    Go in 1 day      [facility], Emergency Dept  [address] [facility] [id number] [phone]  Go to   As needed    -DISCHARGE-    Electronically signed by: Charity Blackmon D.O., 6/16/2025 12:47 PM           [1]   Allergies  Allergen Reactions    Tetrahydrocannabinol (Thc Non-Synthetic) [Tetrahydrocannabinol] Anaphylaxis    Codeine Hives and Vomiting    Strawberry

## 2025-06-16 NOTE — CONSULTS
Thoracic Surgery Consult    S: Developed increased pain now 1 week after left thoracotomy for excision of a bronchial carcinoid tumor. Denies fever, chills, cough. Breathing without wheeze, slight SOB.    O:  Recent Labs     06/16/25  1226   WBC 8.9   RBC 4.46   HEMOGLOBIN 13.3   HEMATOCRIT 40.9   MCV 91.7   MCH 29.8   MCHC 32.5   RDW 45.1   PLATELETCT 349   MPV 9.9             No current facility-administered medications for this encounter.     Current Outpatient Medications   Medication    oxyCODONE immediate-release (ROXICODONE) 5 MG Tab    acetaminophen (TYLENOL) 500 MG Tab    oxymetazoline (AFRIN) 0.05 % Solution    diphenhydrAMINE (BENADRYL) 25 MG Tab    ezetimibe (ZETIA) 10 MG Tab    levothyroxine (SYNTHROID) 75 MCG Tab    hydroCHLOROthiazide 25 MG Tab       PE:  BP (!) 127/93   Pulse 87   Temp 36.1 °C (97 °F) (Temporal)   Resp (!) 24   Wt 124 kg (272 lb 7.8 oz)   SpO2 97%   No intake or output data in the 24 hours ending 06/16/25 1400    Alert, NAD  Chest clear  Heart regular  Wound dry    Rads:  DX-CHEST-PORTABLE (1 VIEW)   Final Result      1.  Mild cardiomegaly.   2.  Small left pleural effusion.   3.  Bibasilar atelectasis.          A: Bronchial carcinoid  Incisional pain  No indication for PE      P: Discussed options to improve pain control  Can discharge  Will see her in office tomorrow to re-evaluate and remove yonatan.    John Ganser M.D.  [facility]

## 2025-06-16 NOTE — ED NOTES
Patient has been provided written and verbal education on post op pain. She is ambulatory on DC on home 02 with Family. She will follow up with gansert in the office tomorrow.

## 2025-06-16 NOTE — ED NOTES
Dr. Ganser did come to room to see patient. Pt ws up to bedside commode. He states he will follow up soon.

## 2025-06-17 ENCOUNTER — PHARMACY VISIT (OUTPATIENT)
Dept: PHARMACY | Facility: MEDICAL CENTER | Age: 55
End: 2025-06-17
Payer: COMMERCIAL

## 2025-06-17 PROCEDURE — RXMED WILLOW AMBULATORY MEDICATION CHARGE: Performed by: SURGERY

## 2025-06-17 RX ORDER — OXYCODONE HYDROCHLORIDE 5 MG/1
TABLET ORAL
Qty: 30 TABLET | Refills: 0 | OUTPATIENT
Start: 2025-06-17

## 2025-07-01 ENCOUNTER — PHARMACY VISIT (OUTPATIENT)
Dept: PHARMACY | Facility: MEDICAL CENTER | Age: 55
End: 2025-07-01
Payer: COMMERCIAL

## 2025-07-01 PROCEDURE — RXMED WILLOW AMBULATORY MEDICATION CHARGE: Performed by: SURGERY

## 2025-07-01 RX ORDER — OXYCODONE HYDROCHLORIDE 5 MG/1
TABLET ORAL
Qty: 42 TABLET | Refills: 0 | OUTPATIENT
Start: 2025-07-01 | End: 2025-07-15

## 2025-07-07 NOTE — Clinical Note
REFERRAL APPROVAL NOTICE         Sent on July 7, 2025                   Hanna Melo  [address] [place] [address]                   Dear Ms. Melo,    After a careful review of the medical information and benefit coverage, [facility] has processed your referral. See below for additional details.    If applicable, you must be actively enrolled with your insurance for coverage of the authorized service. If you have any questions regarding your coverage, please contact your insurance directly.    REFERRAL INFORMATION   Referral #:  [id number]  Referred-To Department    Referred-By Provider:  Hematology Oncology    John H Ganser, M.D.   [place] [facility] [place]      [address]  [address]  [facility] [address]  [phone] [address]  [address]  [facility] [id number] [phone]    Referral Start Date:  07/03/2025  Referral End Date:   07/03/2026           SCHEDULING  If you do not already have an appointment, please call [phone] to make an appointment.   MORE INFORMATION  As a reminder, [facility] ownership has changed, meaning this location is now owned and operated by [facility]. As such, we want to clarify that our patients should expect to receive two separate bills for the services received at [facility] - one representing the [facility] facility fees as the owner of the establishment, and the other to represent the physician's services and subsequent fees. You can speak with your insurance carrier for a pricing estimate by calling the customer service number on the back of your card and ask about charges for a hospital outpatient visit.  If you do not already have a [employer] account, sign up at: [email or web address]  You can access your medical information, make appointments, see lab results, billing information, and more.  If you have questions regarding this referral, please contact  the [facility] Referrals department at:             [phone]. Monday - Friday 7:30AM -  5:00PM.      Sincerely,  [facility]

## 2025-07-10 ENCOUNTER — PATIENT OUTREACH (OUTPATIENT)
Dept: ONCOLOGY | Facility: MEDICAL CENTER | Age: 55
End: 2025-07-10

## 2025-07-10 NOTE — PROGRESS NOTES
Referral received, call placed to patient.  Introduced self and explained role of navigator.  She is aware of appointment next week with Dr Dodge.  Pt is aware, denies transportation barrier and her daughter will be coming with her.  When asked patient stating it has been a painful recovery process.  She has seen Dr Ganser and sees him again on the 15th.  Provided education and information about different types of pain and encouraged her to discuss with surgeon.  Pt states she would prefer not to be on narcotics and she said he would like to see her off and does not plan to give her additional pain medication.  She will discuss with him other options.  Answered questions on why she was seeing medical oncology.  Dr Dodge will review path report and discuss if additional treatment is needed and what follow up should be.  Pt states understanding.  Plan on meeting with when she comes in for consult next week.

## 2025-07-18 ENCOUNTER — RESEARCH ENCOUNTER (OUTPATIENT)
Dept: RESEARCH | Facility: MEDICAL CENTER | Age: 55
End: 2025-07-18

## 2025-07-18 ENCOUNTER — HOSPITAL ENCOUNTER (OUTPATIENT)
Dept: HEMATOLOGY ONCOLOGY | Facility: MEDICAL CENTER | Age: 55
End: 2025-07-18
Attending: STUDENT IN AN ORGANIZED HEALTH CARE EDUCATION/TRAINING PROGRAM
Payer: COMMERCIAL

## 2025-07-18 ENCOUNTER — PATIENT OUTREACH (OUTPATIENT)
Dept: ONCOLOGY | Facility: MEDICAL CENTER | Age: 55
End: 2025-07-18

## 2025-07-18 VITALS
BODY MASS INDEX: 42.68 KG/M2 | TEMPERATURE: 99 F | SYSTOLIC BLOOD PRESSURE: 122 MMHG | OXYGEN SATURATION: 95 % | WEIGHT: 256.17 LBS | DIASTOLIC BLOOD PRESSURE: 84 MMHG | HEART RATE: 93 BPM | HEIGHT: 65 IN

## 2025-07-18 DIAGNOSIS — E03.9 HYPOTHYROIDISM, UNSPECIFIED TYPE: ICD-10-CM

## 2025-07-18 DIAGNOSIS — C7A.090 MALIGNANT CARCINOID TUMOR OF LUNG (HCC): Primary | ICD-10-CM

## 2025-07-18 PROCEDURE — 99204 OFFICE O/P NEW MOD 45 MIN: CPT | Performed by: STUDENT IN AN ORGANIZED HEALTH CARE EDUCATION/TRAINING PROGRAM

## 2025-07-18 PROCEDURE — 99213 OFFICE O/P EST LOW 20 MIN: CPT | Performed by: STUDENT IN AN ORGANIZED HEALTH CARE EDUCATION/TRAINING PROGRAM

## 2025-07-18 RX ORDER — GABAPENTIN 300 MG/1
300 CAPSULE ORAL 3 TIMES DAILY
COMMUNITY

## 2025-07-18 ASSESSMENT — ENCOUNTER SYMPTOMS
FOCAL WEAKNESS: 0
HEARTBURN: 0
WHEEZING: 0
SORE THROAT: 0
ORTHOPNEA: 0
NAUSEA: 0
BLURRED VISION: 0
DEPRESSION: 0
COUGH: 0
WEIGHT LOSS: 0
BRUISES/BLEEDS EASILY: 0
DIZZINESS: 0
SENSORY CHANGE: 0
TINGLING: 0
PALPITATIONS: 0
SPUTUM PRODUCTION: 0
NECK PAIN: 0
SHORTNESS OF BREATH: 0
VOMITING: 0
MEMORY LOSS: 0
FEVER: 0
HEADACHES: 0
ABDOMINAL PAIN: 0
TREMORS: 0
CHILLS: 0

## 2025-07-18 ASSESSMENT — FIBROSIS 4 INDEX: FIB4 SCORE: 1.34

## 2025-07-18 NOTE — ADDENDUM NOTE
Encounter addended by: Gorge Wells on: 7/18/2025 2:14 PM   Actions taken: Charge Capture section accepted

## 2025-07-18 NOTE — PROGRESS NOTES
Met with patient and daughter after oncology consult.  Pt reports no current questions after consult.  Is aware of next steps.  Denies transportation barrier, states no problems with co-pays or financial currently, has support.  Business card provided to patient, reviewed role of navigator.   Current plan per note:  -dotatate PET 3 months after surgery (around 9/9/2025) with medical oncology f/u, then CT Chest yearly afterwards.

## 2025-07-18 NOTE — PROGRESS NOTES
Consult Note: Hematology/Oncology     Primary Care:  Cuauhtemoc Barone II, M.D.    Chief Complaint   Patient presents with    New Patient     Neuroendocrine carcinoma of lung       Current Treatment: None    Prior Treatment: 25: Sleeve resection of endobronchial tumor L lung, pT1b pN0    Subjective:   History of Presenting Illness:  Hanna Melo is a 54 y.o. female who presents today with a typical NET of the left lung, WD, grade 1 s/p resection.    Patient reports that for the past 2 years she's been having difficulty breathing.   She got a referral to pulmonary.  She saw Dr. Stringer who preformed bronchoscopy.  This shows a benign pulmonary hamartoma, and she was sent to surgery with Dr. Ganser. thoracotomy with removal of the bronchial mass by sleeve resection without removing any lung. No complications. O2 sats baseline. Pain controlled.     Pathology shows bronchial carcinoid tumor, node negative.     She is here to discuss results and plan moving forward.     She underwent a       Past Medical History[1]     Past Surgical History[2]    Social History[3]     Family History   Problem Relation Age of Onset    Hypertension Mother         Controlled with medication    Thyroid Mother     Alcohol abuse Father     Lung Disease Father             Breast Cancer Sister     No Known Problems Brother     Diabetes Maternal Grandmother             Diabetes Paternal Grandmother             Breast Cancer Paternal Grandmother             Breast Cancer Paternal Aunt        Allergies[4]    Current Medications[5]    Review of Systems   Constitutional:  Negative for chills, fever, malaise/fatigue and weight loss.   HENT:  Negative for congestion, ear pain, nosebleeds and sore throat.    Eyes:  Negative for blurred vision.   Respiratory:  Negative for cough, sputum production, shortness of breath and wheezing.    Cardiovascular:  Negative for chest pain, palpitations, orthopnea and leg  "swelling.   Gastrointestinal:  Negative for abdominal pain, heartburn, nausea and vomiting.   Genitourinary:  Negative for dysuria, frequency and urgency.   Musculoskeletal:  Negative for neck pain.        Chest wall pain  L breast pain from surgery   Neurological:  Negative for dizziness, tingling, tremors, sensory change, focal weakness and headaches.   Endo/Heme/Allergies:  Does not bruise/bleed easily.   Psychiatric/Behavioral:  Negative for depression, memory loss and suicidal ideas.    All other systems reviewed and are negative.      Problem list, medications, and allergies reviewed by myself today in Epic.     Objective:     Vitals:    07/18/25 1327   BP: 122/84   BP Location: Left arm   Patient Position: Sitting   BP Cuff Size: Adult   Pulse: 93   Temp: 37.2 °C (99 °F)   TempSrc: Temporal   SpO2: 95%   Weight: 116 kg (256 lb 2.8 oz)   Height: 1.651 m (5' 5\")       DESC; KARNOFSKY SCALE WITH ECOG EQUIVALENT: 90, Able to carry on normal activity; minor signs or symptoms of disease (ECOG equivalent 0)    DISTRESS LEVEL: no acute distress    Physical Exam  Constitutional:       General: She is not in acute distress.     Appearance: Normal appearance. She is not ill-appearing.   HENT:      Head: Normocephalic and atraumatic.      Nose: Nose normal.      Mouth/Throat:      Mouth: Mucous membranes are moist.      Pharynx: No oropharyngeal exudate or posterior oropharyngeal erythema.   Eyes:      General: No scleral icterus.     Conjunctiva/sclera: Conjunctivae normal.      Pupils: Pupils are equal, round, and reactive to light.   Cardiovascular:      Rate and Rhythm: Normal rate and regular rhythm.      Pulses: Normal pulses.      Heart sounds: Normal heart sounds. No murmur heard.     No friction rub. No gallop.   Pulmonary:      Effort: Pulmonary effort is normal. No respiratory distress.      Breath sounds: Normal breath sounds. No stridor. No wheezing, rhonchi or rales.      Comments: Well healing wound for " resection  Chest:      Chest wall: No tenderness.   Abdominal:      General: Abdomen is flat. Bowel sounds are normal. There is no distension.      Palpations: Abdomen is soft. There is no mass.      Tenderness: There is no abdominal tenderness. There is no guarding.   Musculoskeletal:         General: No swelling, tenderness or deformity. Normal range of motion.      Cervical back: Normal range of motion and neck supple. No rigidity or tenderness.      Right lower leg: No edema.      Left lower leg: No edema.   Skin:     General: Skin is warm and dry.      Coloration: Skin is not jaundiced or pale.      Findings: No bruising, erythema or rash.   Neurological:      General: No focal deficit present.      Mental Status: She is alert and oriented to person, place, and time. Mental status is at baseline.      Sensory: No sensory deficit.      Motor: No weakness.      Coordination: Coordination normal.      Gait: Gait normal.   Psychiatric:         Mood and Affect: Mood normal.         Behavior: Behavior normal.         Thought Content: Thought content normal.         Judgment: Judgment normal.         Labs:   Most recent labs reviewed.  Please see the lab tab of chart review    Imaging:   Most recent images below have been independently reviewed by me.      1. Stable small likely benign bilateral pulmonary nodules, largest measuring 5. Recommend follow-up chest CT in one year.  2. Emphysema.  3. Coronary calcium conclusions.  4. Lipomatous hypertrophy of interatrial septum.  5. Hepatic steatosis.    Pathology:  FINAL DIAGNOSIS:     A. Hilar node, station 10:          One lymph node with no tumor present (0/1).   B. Subcarinal node, station 7:          One lymph node with no tumor present (0/1).   C. Parabronchial node, station 11:          One lymph node (in four fragments) with no tumor present (0/1).   D. Endobronchial tumor:          Well-differentiated neuroendocrine tumor, Grade 1 (typical           carcinoid  tumor), measuring 2.0 cm in greatest dimension.          Resection margin difficult to evaluate due to surface disruption           and uncertain orientation, but the presumed bronchial wall           margin is likely narrowly (less than 0.1 cm) free of tumor.          Mib-1/Ki-67 proliferation rate: 1-2%.     LUNG     SPECIMEN    Procedure:  Sleeve resection of endobronchial tumor     Specimen Laterality:  Left   TUMOR    Tumor Focality:  Single focus     Tumor Site:  Bronchus, main     Tumor Size       Invasive Tumor Size:  Greatest Dimension (Centimeters) - 2.0 x 1.6       x 1.3 cm     Histologic Type:  Typical carcinoid / Neuroendocrine tumor, grade 1     Spread Through Air Spaces (LUIS):  Not identified     Visceral Pleura Invasion:  Not identified     Direct Invasion of Other Structures:  Not applicable (no other     structures present)     Treatment Effect:  No known presurgical therapy     Lymphatic and / or Vascular Invasion:  Not identified   MARGINS    Margin Status for Invasive Tumor:  All margins negative for invasive     tumor       Closest Margin(s) to Invasive Tumor:  Bronchial wall       Distance from Invasive Tumor to Closest Margin:  Less than - 0.1 cm     Margin Status for Non-Invasive Tumor:  Not applicable   REGIONAL LYMPH NODES     Lymph Node(s) from Prior Procedures:  No known prior lymph node     sampling performed     Regional Lymph Node Status:  All regional lymph nodes negative for     tumor       Number of Lymph Nodes Examined:  3         Rianna Site(s) Examined:  7: Subcarinal, 10L: Hilar, 11L:         Interlobar   pTNM CLASSIFICATION (AJCC Version 9)   Reporting of pT, pN, and (when applicable) pM categories is based on   information available to the pathologist at the time the report is   issued. As per the AJCC (Chapter 1, 8th Ed.) it is the managing   physician's responsibility to establish the final pathologic stage   based upon all pertinent information, including but potentially  not   limited to this pathology report.     pT Category:  pT1b     pN Category:  pN0     COMMENT:   The prior biopsy [id number], 4/21/2025, showed findings consistent with a   benign pulmonary hamartoma. However, this resection reveals that the   bulk of the endobronchial tumor is a neuroendocrine tumor with only a   superficial cartilage-like layer.     Assessment/Plan:         Typical carcinoid / Neuroendocrine tumor,  grade 1   pT1b pNo  Stage IA2     We discussed the results of her pathology report  We also discussed her stage     We reviewed the NCCN guidelines which report that we should do scans 3 months after surgery and then CT chest 12-24 months for 10 years    We discussed that since the original CT scan in April showed concerns for benign pulmonary bilateral nodules I we will get a dotatate PET scan     Besides healing from surgery she seems to be doing well        Plan  -dotatate PET 3 months after surgery (around 9/9/2025)  -f/u afterwards  -Then CT Chest yearly afterwards      2. Hypothyroidism  -continue synthroid 75mcg     3. Pain from surgery  -continue aleve as needed  -can continue gabapentin     4. Research:    Discussed Prim[facility]Vision.  Patient interested in a referral.  Referral to [facility] placed.        Patient has chronic medical conditions x3  She has a malignancy that we are monitoring closely, it threatened bodily function  I reviewed 3+ labs  I reviewed 3+ clinic notes  I interpreted results ordered by another physician     Any questions and concerns raised by the patient were addressed and answered. Patient denies any further questions.  Patient encouraged to call the office with any concerns or issues.       Any questions and concerns raised by the patient were addressed and answered. Patient denies any further questions.  Patient encouraged to call the office with any concerns or issues.     Arielle Dodge M.D.  Hematology/Oncology                   [1]   Past Medical  History:  Diagnosis Date    Anesthesia     difficulty waking up with knee procedure    Bronchitis     hx    Delayed emergence from general anesthesia     Dental disorder     dentures top and bottom (bottom plate has name of Michelle- pre marriage name)    Disorder of thyroid     hypo    Emphysema of lung (HCC)     pt states unsure 5/22/2025    Hemangioma of liver     Hemorrhagic disorder (HCC)     hemorrhage after child birth 1997    High cholesterol     no medications at this time    Hypertension     medication    Pancreatic divisum     Shortness of breath     Sleep apnea     no device, pending 2nd sleep study    Snoring     Wheezing    [2]   Past Surgical History:  Procedure Laterality Date    KY BRONCHOSCOPY,DIAGNOSTIC  6/9/2025    Procedure: BRONCHOSCOPY;  Surgeon: John H Ganser, M.D.;  Location: SURGERY [facility];  Service: General    THORACOTOMY  6/9/2025    Procedure: BRONCHOSCOPY, LEFT THORACOTOMY WITH SLEEVE RESECTION OF BRONCHIAL TUMOR AND NODE SAMPLING;  Surgeon: John H Ganser, M.D.;  Location: SURGERY [facility];  Service: General    KY BRONCHOSCOPY,DIAGNOSTIC N/A 4/21/2025    Procedure: FIBER OPTIC BRONCHOSCOPY WITH  WASH, BRONCHOALVEOLAR LAVAGE, BIOPSY AND FINE NEEDLE ASPIRATION;  Surgeon: Angelica Stringer D.O.;  Location: SURGERY [facility];  Service: Pulmonary    RECONSTRUCTION, KNEE, ACL Left 2007    LAPAROSCOPY  2006    lysis of adhesions    HYSTERECTOMY, TOTAL ABDOMINAL  2002    ovaries taken also    TUBAL LIGATION  1997    PRIMARY C SECTION  1987   [3]   Social History  Tobacco Use    Smoking status: Former     Current packs/day: 1.00     Average packs/day: 1 pack/day for 36.0 years (36.0 ttl pk-yrs)     Types: Cigarettes     Start date: 7/26/1989    Smokeless tobacco: Never    Tobacco comments:     Still use nicorette lozenges 4 mg. Last one 6/8/25   Vaping Use    Vaping status: Former   Substance Use Topics    Alcohol use: Not Currently     Alcohol/week: 3.0 oz     Types: 5 Shots of  liquor per week     Comment: Not currently my daughter wonjuan daniel let me    Drug use: Not Currently     Types: Inhaled     Comment: 20+ years drug free   [4]   Allergies  Allergen Reactions    Tetrahydrocannabinol (Thc Non-Synthetic) [Tetrahydrocannabinol] Anaphylaxis    Codeine Hives and Vomiting    Strawberry    [5]   Current Outpatient Medications   Medication Sig Dispense Refill    gabapentin (NEURONTIN) 300 MG Cap Take 300 mg by mouth 3 times a day.      acetaminophen (TYLENOL) 500 MG Tab Take 1,000 mg by mouth every 6 hours as needed for Moderate Pain.      oxymetazoline (AFRIN) 0.05 % Solution Administer 2 Sprays into affected nostril(S) 2 times a day as needed for Congestion.      diphenhydrAMINE (BENADRYL) 25 MG Tab Take 25 mg by mouth at bedtime as needed for Sleep.      ezetimibe (ZETIA) 10 MG Tab Take 10 mg by mouth every day.      levothyroxine (SYNTHROID) 75 MCG Tab Take 75 mcg by mouth every morning on an empty stomach. (Patient taking differently: Take 75 mcg by mouth every morning on an empty stomach. Patient taking 100 mcg daily)      hydroCHLOROthiazide 25 MG Tab Take 25 mg by mouth every day.       No current facility-administered medications for this encounter.

## 2025-08-04 ENCOUNTER — APPOINTMENT (OUTPATIENT)
Dept: SLEEP MEDICINE | Facility: MEDICAL CENTER | Age: 55
End: 2025-08-04
Payer: COMMERCIAL

## 2025-08-04 ENCOUNTER — PATIENT MESSAGE (OUTPATIENT)
Dept: SLEEP MEDICINE | Facility: MEDICAL CENTER | Age: 55
End: 2025-08-04

## 2025-08-04 DIAGNOSIS — J44.9 OBSTRUCTIVE LUNG DISEASE (GENERALIZED) (HCC): Primary | ICD-10-CM

## 2025-08-04 DIAGNOSIS — G47.34 NOCTURNAL HYPOXIA: ICD-10-CM

## (undated) DEVICE — GOWN WARMING STANDARD FLEX - (30/CA)

## (undated) DEVICE — ELECTRODE DUAL RETURN W/ CORD - (50/PK)

## (undated) DEVICE — SUTURE 0 VICRYL PLUS CTX - 36 INCH (36/BX)

## (undated) DEVICE — SUTURE 1 VICRYL PLUS CTX - 36 INCH (36/BX)

## (undated) DEVICE — SUCTION INSTRUMENT YANKAUER BULBOUS TIP W/O VENT (50EA/CA)

## (undated) DEVICE — SPONGE KITTNER DISSECTORS - (5EA/PK 50PK/CA)

## (undated) DEVICE — COVER TABLE 44 X 90 - (22/CA)

## (undated) DEVICE — TUBING CLEARLINK DUO-VENT - C-FLO (48EA/CA)

## (undated) DEVICE — SLEEVE VASO DVT COMPRESSION CALF MED - (10PR/CA)

## (undated) DEVICE — DRAPE IOBAN II INCISE 23X17 - (10EA/BX 4BX/CA)

## (undated) DEVICE — SODIUM CHL IRRIGATION 0.9% 1000ML (12EA/CA)

## (undated) DEVICE — SENSOR OXIMETER ADULT SPO2 RD SET (20EA/BX)

## (undated) DEVICE — SYSTEM CHEST DRAIN ADULT/PEDS W/AUTO TRANSFUSION CAPABILITY SAHARA (6EA/CA)

## (undated) DEVICE — SEALANT TISSUE CLOSURE KIT FIBIRIN VISTASEAL 10ML (1EA/BX)

## (undated) DEVICE — CLIP LG INTNL HRZN TI ESCP LGT - (20/BX)

## (undated) DEVICE — FORCEPS BRONCH DISPOSABLE ALLIGATOR JAW (20EA/BX)

## (undated) DEVICE — BOVIE BLADE 6 EXTENDED - (50/PK)

## (undated) DEVICE — TUBE NG SALEM SUMP 16FR (50EA/CA)

## (undated) DEVICE — DRAPE MAGNETIC (INSTRA-MAG) - (30/CA)

## (undated) DEVICE — TOWEL STOP TIMEOUT SAFETY FLAG (40EA/CA)

## (undated) DEVICE — PACK MAJOR BASIN - (3EA/CA)

## (undated) DEVICE — CLIP MED LG INTNL HRZN TI ESCP - (20/BX)

## (undated) DEVICE — CANISTER SUCTION 3000ML MECHANICAL FILTER AUTO SHUTOFF MEDI-VAC NONSTERILE LF DISP (40EA/CA)

## (undated) DEVICE — DRAPE CHEST/BREAST - (12EA/CA)

## (undated) DEVICE — LACTATED RINGERS INJ 1000 ML - (14EA/CA 60CA/PF)

## (undated) DEVICE — CHLORAPREP 26 ML APPLICATOR - ORANGE TINT(25/CA)

## (undated) DEVICE — COVER LIGHT HANDLE FLEXIBLE - SOFT (2EA/PK 80PK/CA)

## (undated) DEVICE — CLIP INTERNAL LIGATE TITANIUM MEDIUM WECK HORIZON (6EA/PK 30PK/BX)

## (undated) DEVICE — TISSEEL 4ML ----MUST ORDER A MIN OF 6EA----

## (undated) DEVICE — SYRINGE 10 ML CONTROL LL (25EA/BX 4BX/CA)

## (undated) DEVICE — CONNECTOR Y TBG CRTY 5 IN 1 STERILE (50EA/CA)

## (undated) DEVICE — BRONCHOSCOPE EXALT MODEL B REGULAR (10EA/BX)

## (undated) DEVICE — SUTURE 0 SILK TIES (36PK/BX)

## (undated) DEVICE — SPONGE GAUZESTER 4 X 4 4PLY - (128PK/CA)

## (undated) DEVICE — SOD. CHL. INJ. 0.9% 1000 ML - (14EA/CA 60CA/PF)

## (undated) DEVICE — SET LEADWIRE 5 LEAD BEDSIDE DISPOSABLE ECG (1SET OF 5/EA)

## (undated) DEVICE — SPONGE DRAIN 4 X 4IN 6-PLY - (2/PK25PK/BX12BX/CS)

## (undated) DEVICE — DRAPE MAYO STAND - (30/CA)

## (undated) DEVICE — COVER LIGHT HANDLE ALC PLUS DISP (18EA/BX)

## (undated) DEVICE — TOWELS CLOTH SURGICAL - (4/PK 20PK/CA)

## (undated) DEVICE — DRESSING NON-ADHERING 8 X 3 - (50/BX)

## (undated) DEVICE — GLOVE BIOGEL M SZ 8 SURGICAL PF LTX - (50/BX 4BX/CA)

## (undated) DEVICE — SUTURE GENERAL

## (undated) DEVICE — SET EXTENSION WITH 2 PORTS (48EA/CA) ***PART #2C8610 IS A SUBSTITUTE*****

## (undated) DEVICE — MEDICINE CUP STERILE 2 OZ (100/CA)

## (undated) DEVICE — SUTURE 3-0 VICRYL PLUS SH - 8X 18 INCH (12/BX)

## (undated) DEVICE — SUTURE 0 SILK CT-1 (36PK/BX)